# Patient Record
Sex: MALE | Race: OTHER | Employment: FULL TIME | URBAN - METROPOLITAN AREA
[De-identification: names, ages, dates, MRNs, and addresses within clinical notes are randomized per-mention and may not be internally consistent; named-entity substitution may affect disease eponyms.]

---

## 2024-04-23 ENCOUNTER — APPOINTMENT (EMERGENCY)
Dept: RADIOLOGY | Facility: HOSPITAL | Age: 54
End: 2024-04-23
Payer: COMMERCIAL

## 2024-04-23 ENCOUNTER — HOSPITAL ENCOUNTER (EMERGENCY)
Facility: HOSPITAL | Age: 54
Discharge: HOME | End: 2024-04-23
Attending: EMERGENCY MEDICINE
Payer: COMMERCIAL

## 2024-04-23 VITALS
TEMPERATURE: 98 F | SYSTOLIC BLOOD PRESSURE: 145 MMHG | RESPIRATION RATE: 18 BRPM | HEART RATE: 79 BPM | OXYGEN SATURATION: 97 % | DIASTOLIC BLOOD PRESSURE: 87 MMHG

## 2024-04-23 DIAGNOSIS — R50.9 FEVER: ICD-10-CM

## 2024-04-23 DIAGNOSIS — L02.91 ABSCESS: Primary | ICD-10-CM

## 2024-04-23 LAB
ALBUMIN SERPL BCP-MCNC: 3.6 G/DL (ref 3.5–5)
ALP SERPL-CCNC: 71 U/L (ref 34–104)
ALT SERPL W P-5'-P-CCNC: 17 U/L (ref 7–52)
ANION GAP SERPL CALCULATED.3IONS-SCNC: 6 MMOL/L (ref 4–13)
APTT PPP: 29 SECONDS (ref 23–37)
AST SERPL W P-5'-P-CCNC: 14 U/L (ref 13–39)
ATRIAL RATE: 89 BPM
BACTERIA UR QL AUTO: NORMAL /HPF
BASOPHILS # BLD AUTO: 0.03 THOUSANDS/ÂΜL (ref 0–0.1)
BASOPHILS NFR BLD AUTO: 0 % (ref 0–1)
BILIRUB SERPL-MCNC: 0.52 MG/DL (ref 0.2–1)
BILIRUB UR QL STRIP: NEGATIVE
BUN SERPL-MCNC: 13 MG/DL (ref 5–25)
CALCIUM SERPL-MCNC: 8.8 MG/DL (ref 8.4–10.2)
CHLORIDE SERPL-SCNC: 102 MMOL/L (ref 96–108)
CLARITY UR: CLEAR
CO2 SERPL-SCNC: 29 MMOL/L (ref 21–32)
COLOR UR: YELLOW
CREAT SERPL-MCNC: 1 MG/DL (ref 0.6–1.3)
EOSINOPHIL # BLD AUTO: 0.07 THOUSAND/ÂΜL (ref 0–0.61)
EOSINOPHIL NFR BLD AUTO: 1 % (ref 0–6)
ERYTHROCYTE [DISTWIDTH] IN BLOOD BY AUTOMATED COUNT: 13.2 % (ref 11.6–15.1)
FLUAV RNA RESP QL NAA+PROBE: NEGATIVE
FLUBV RNA RESP QL NAA+PROBE: NEGATIVE
GFR SERPL CREATININE-BSD FRML MDRD: 84 ML/MIN/1.73SQ M
GLUCOSE SERPL-MCNC: 208 MG/DL (ref 65–140)
GLUCOSE UR STRIP-MCNC: ABNORMAL MG/DL
HCT VFR BLD AUTO: 39 % (ref 36.5–49.3)
HGB BLD-MCNC: 13 G/DL (ref 12–17)
HGB UR QL STRIP.AUTO: NEGATIVE
IMM GRANULOCYTES # BLD AUTO: 0.04 THOUSAND/UL (ref 0–0.2)
IMM GRANULOCYTES NFR BLD AUTO: 0 % (ref 0–2)
INR PPP: 1.17 (ref 0.84–1.19)
KETONES UR STRIP-MCNC: ABNORMAL MG/DL
LEUKOCYTE ESTERASE UR QL STRIP: NEGATIVE
LYMPHOCYTES # BLD AUTO: 1.37 THOUSANDS/ÂΜL (ref 0.6–4.47)
LYMPHOCYTES NFR BLD AUTO: 10 % (ref 14–44)
MCH RBC QN AUTO: 28.1 PG (ref 26.8–34.3)
MCHC RBC AUTO-ENTMCNC: 33.3 G/DL (ref 31.4–37.4)
MCV RBC AUTO: 84 FL (ref 82–98)
MONOCYTES # BLD AUTO: 1.03 THOUSAND/ÂΜL (ref 0.17–1.22)
MONOCYTES NFR BLD AUTO: 8 % (ref 4–12)
NEUTROPHILS # BLD AUTO: 10.93 THOUSANDS/ÂΜL (ref 1.85–7.62)
NEUTS SEG NFR BLD AUTO: 81 % (ref 43–75)
NITRITE UR QL STRIP: NEGATIVE
NON-SQ EPI CELLS URNS QL MICRO: NORMAL /HPF
NRBC BLD AUTO-RTO: 0 /100 WBCS
P AXIS: 64 DEGREES
PH UR STRIP.AUTO: 5.5 [PH] (ref 4.5–8)
PLATELET # BLD AUTO: 170 THOUSANDS/UL (ref 149–390)
PMV BLD AUTO: 10 FL (ref 8.9–12.7)
POTASSIUM SERPL-SCNC: 4.4 MMOL/L (ref 3.5–5.3)
PR INTERVAL: 144 MS
PROT SERPL-MCNC: 6.9 G/DL (ref 6.4–8.4)
PROT UR STRIP-MCNC: ABNORMAL MG/DL
PROTHROMBIN TIME: 14.8 SECONDS (ref 11.6–14.5)
QRS AXIS: 164 DEGREES
QRSD INTERVAL: 104 MS
QT INTERVAL: 378 MS
QTC INTERVAL: 459 MS
RBC # BLD AUTO: 4.63 MILLION/UL (ref 3.88–5.62)
RBC #/AREA URNS AUTO: NORMAL /HPF
RSV RNA RESP QL NAA+PROBE: NEGATIVE
SARS-COV-2 RNA RESP QL NAA+PROBE: NEGATIVE
SODIUM SERPL-SCNC: 137 MMOL/L (ref 135–147)
SP GR UR STRIP.AUTO: 1.02 (ref 1–1.03)
T WAVE AXIS: 80 DEGREES
UROBILINOGEN UR QL STRIP.AUTO: 0.2 E.U./DL
VENTRICULAR RATE: 89 BPM
WBC # BLD AUTO: 13.47 THOUSAND/UL (ref 4.31–10.16)
WBC #/AREA URNS AUTO: NORMAL /HPF

## 2024-04-23 PROCEDURE — 99285 EMERGENCY DEPT VISIT HI MDM: CPT | Performed by: EMERGENCY MEDICINE

## 2024-04-23 PROCEDURE — 96374 THER/PROPH/DIAG INJ IV PUSH: CPT

## 2024-04-23 PROCEDURE — 99284 EMERGENCY DEPT VISIT MOD MDM: CPT

## 2024-04-23 PROCEDURE — 87147 CULTURE TYPE IMMUNOLOGIC: CPT

## 2024-04-23 PROCEDURE — 81001 URINALYSIS AUTO W/SCOPE: CPT

## 2024-04-23 PROCEDURE — 36415 COLL VENOUS BLD VENIPUNCTURE: CPT

## 2024-04-23 PROCEDURE — 75635 CT ANGIO ABDOMINAL ARTERIES: CPT

## 2024-04-23 PROCEDURE — 85025 COMPLETE CBC W/AUTO DIFF WBC: CPT

## 2024-04-23 PROCEDURE — 96375 TX/PRO/DX INJ NEW DRUG ADDON: CPT

## 2024-04-23 PROCEDURE — 0241U HB NFCT DS VIR RESP RNA 4 TRGT: CPT

## 2024-04-23 PROCEDURE — 85610 PROTHROMBIN TIME: CPT

## 2024-04-23 PROCEDURE — 96361 HYDRATE IV INFUSION ADD-ON: CPT

## 2024-04-23 PROCEDURE — 87205 SMEAR GRAM STAIN: CPT

## 2024-04-23 PROCEDURE — 10060 I&D ABSCESS SIMPLE/SINGLE: CPT | Performed by: SURGERY

## 2024-04-23 PROCEDURE — 87075 CULTR BACTERIA EXCEPT BLOOD: CPT

## 2024-04-23 PROCEDURE — 87070 CULTURE OTHR SPECIMN AEROBIC: CPT

## 2024-04-23 PROCEDURE — 93005 ELECTROCARDIOGRAM TRACING: CPT

## 2024-04-23 PROCEDURE — 93010 ELECTROCARDIOGRAM REPORT: CPT | Performed by: INTERNAL MEDICINE

## 2024-04-23 PROCEDURE — 85730 THROMBOPLASTIN TIME PARTIAL: CPT

## 2024-04-23 PROCEDURE — 99243 OFF/OP CNSLTJ NEW/EST LOW 30: CPT | Performed by: SURGERY

## 2024-04-23 PROCEDURE — 80053 COMPREHEN METABOLIC PANEL: CPT

## 2024-04-23 RX ORDER — HYDROMORPHONE HCL/PF 1 MG/ML
0.5 SYRINGE (ML) INJECTION ONCE
Status: COMPLETED | OUTPATIENT
Start: 2024-04-23 | End: 2024-04-23

## 2024-04-23 RX ORDER — SULFAMETHOXAZOLE AND TRIMETHOPRIM 800; 160 MG/1; MG/1
1 TABLET ORAL ONCE
Status: COMPLETED | OUTPATIENT
Start: 2024-04-23 | End: 2024-04-23

## 2024-04-23 RX ORDER — KETOROLAC TROMETHAMINE 30 MG/ML
15 INJECTION, SOLUTION INTRAMUSCULAR; INTRAVENOUS ONCE
Status: COMPLETED | OUTPATIENT
Start: 2024-04-23 | End: 2024-04-23

## 2024-04-23 RX ORDER — SULFAMETHOXAZOLE AND TRIMETHOPRIM 800; 160 MG/1; MG/1
1 TABLET ORAL 2 TIMES DAILY
Qty: 14 TABLET | Refills: 0 | Status: SHIPPED | OUTPATIENT
Start: 2024-04-23 | End: 2024-04-30

## 2024-04-23 RX ORDER — LIDOCAINE HYDROCHLORIDE 10 MG/ML
10 INJECTION, SOLUTION EPIDURAL; INFILTRATION; INTRACAUDAL; PERINEURAL ONCE
Status: COMPLETED | OUTPATIENT
Start: 2024-04-23 | End: 2024-04-23

## 2024-04-23 RX ADMIN — LIDOCAINE HYDROCHLORIDE 10 ML: 10 INJECTION, SOLUTION EPIDURAL; INFILTRATION; INTRACAUDAL; PERINEURAL at 16:53

## 2024-04-23 RX ADMIN — HYDROMORPHONE HYDROCHLORIDE 0.5 MG: 1 INJECTION, SOLUTION INTRAMUSCULAR; INTRAVENOUS; SUBCUTANEOUS at 16:54

## 2024-04-23 RX ADMIN — SODIUM CHLORIDE 1000 ML: 0.9 INJECTION, SOLUTION INTRAVENOUS at 10:15

## 2024-04-23 RX ADMIN — IOHEXOL 120 ML: 350 INJECTION, SOLUTION INTRAVENOUS at 12:05

## 2024-04-23 RX ADMIN — SULFAMETHOXAZOLE AND TRIMETHOPRIM 1 TABLET: 800; 160 TABLET ORAL at 17:39

## 2024-04-23 RX ADMIN — KETOROLAC TROMETHAMINE 15 MG: 30 INJECTION, SOLUTION INTRAMUSCULAR; INTRAVENOUS at 10:20

## 2024-04-23 NOTE — PROCEDURES
"Incision and drain    Date/Time: 4/24/2024 6:00 PM    Performed by: Julián Apple MD  Authorized by: Julián Apple MD  Universal Protocol:  Consent: Verbal consent obtained.  Risks and benefits: risks, benefits and alternatives were discussed  Consent given by: patient  Time out: Immediately prior to procedure a \"time out\" was called to verify the correct patient, procedure, equipment, support staff and site/side marked as required.  Timeout called at: 4/23/2024 6:00 PM.  Patient identity confirmed: verbally with patient, provided demographic data and arm band    Patient location:  ED  Location:     Type:  Fluid collection    Location:  Lower extremity    Lower extremity location:  L leg  Pre-procedure details:     Skin preparation:  Betadine  Sedation:     Sedation type:  Anxiolysis  Anesthesia (see MAR for exact dosages):     Anesthesia method:  Local infiltration    Local anesthetic:  Lidocaine 1% w/o epi  Procedure details:     Complexity:  Simple    Needle aspiration: yes      Needle size:  22 G    Incision types:  Cruciate    Scalpel blade:  15    Approach:  Open and puncture    Incision depth:  Subcutaneous    Wound management:  Probed and deloculated and irrigated with saline    Drainage:  Bloody    Drainage amount:  Moderate    Wound treatment:  Wound left open and packing placed    Packing materials:  1/4 in iodoform gauze  Post-procedure details:     Patient tolerance of procedure:  Tolerated well, no immediate complications        Julián Apple MD  General Surgery   04/24/24      "

## 2024-04-23 NOTE — ED ATTENDING ATTESTATION
4/23/2024  I, Cherise Hartman DO, saw and evaluated the patient. I have discussed the patient with the resident/non-physician practitioner and agree with the resident's/non-physician practitioner's findings, Plan of Care, and MDM as documented in the resident's/non-physician practitioner's note, except where noted. All available labs and Radiology studies were reviewed.  I was present for key portions of any procedure(s) performed by the resident/non-physician practitioner and I was immediately available to provide assistance.       At this point I agree with the current assessment done in the Emergency Department.  I have conducted an independent evaluation of this patient a history and physical is as follows:    53-year-old male presents with pain in his left groin.  Patient states he has swelling in that area that has worsened and is now red and tender.  Patient also complaining of fever.  On exam-no acute distress, heart regular, no respiratory distress, left proximal thigh on the medial aspect patient has erythematous area with swelling that is mildly tender to palpation and is warm to touch.  Plan-unclear etiology of this area of swelling but will do labs, bedside ultrasound inconclusive so we will do CT to further evaluate that area of swelling in the thigh    ED Course         Critical Care Time  Procedures

## 2024-04-23 NOTE — ED PROVIDER NOTES
History  Chief Complaint   Patient presents with    Fever     C/o of fever, sweating and pain in left leg. States his pain is around the groin where he feels a ball, that appeared after open heart surgery. States he is unsure about the ball every since because his chest was cracked for his heart attack. Denies CP or SOB. Took tylenol at 8am     53F w/ h/o DM, prior MI s/p CABG vs PCI about 2 years ago, presents to the ED due to fevers at night with associated sweats and increasing pain in his left proximal thigh. He had a small lump there after the procedure but was told it was normal. He has always noticed it but never had any issues until about 1 week ago. [place] and started noticing increasing pain and redness over the area. He then started developing the nighttime fevers/chills. The symptoms have progressively worsened so he came to the Ed for evaluation. He denies ha, n/v, cp, sob, or other subjective complaitns.     Prior to Admission Medications   Prescriptions Last Dose Informant Patient Reported? Taking?   metFORMIN (GLUCOPHAGE) 1000 MG tablet   Yes No   Sig: Take 1,000 mg by mouth 2 (two) times a day      Facility-Administered Medications: None       Past Medical History:   Diagnosis Date    Diabetes mellitus (HCC)     Heart attack (HCC) 2023       Past Surgical History:   Procedure Laterality Date    CARDIAC PACEMAKER PLACEMENT  2023    CARDIAC SURGERY  11/2023       History reviewed. No pertinent family history.  I have reviewed and agree with the history as documented.    E-Cigarette/Vaping     E-Cigarette/Vaping Substances     Social History     Tobacco Use    Smoking status: Never    Smokeless tobacco: Never   Substance Use Topics    Alcohol use: Yes     Comment: casual    Drug use: Never        Review of Systems   All other systems reviewed and are negative.      Physical Exam  ED Triage Vitals   Temperature Pulse Respirations Blood Pressure SpO2   04/23/24 0928  04/23/24 0928 04/23/24 0928 04/23/24 0928 04/23/24 0928   98 °F (36.7 °C) 90 18 150/98 98 %      Temp Source Heart Rate Source Patient Position - Orthostatic VS BP Location FiO2 (%)   04/23/24 0928 04/23/24 0928 04/23/24 1433 04/23/24 1433 --   Oral Monitor Lying Right arm       Pain Score       04/23/24 0928       8             Orthostatic Vital Signs  Vitals:    04/23/24 0928 04/23/24 1433   BP: 150/98 145/87   Pulse: 90 79   Patient Position - Orthostatic VS:  Lying       Physical Exam  Vitals and nursing note reviewed.   Constitutional:       General: He is not in acute distress.     Appearance: He is well-developed.   HENT:      Head: Normocephalic and atraumatic.   Eyes:      Conjunctiva/sclera: Conjunctivae normal.   Cardiovascular:      Rate and Rhythm: Normal rate and regular rhythm.      Heart sounds: No murmur heard.  Pulmonary:      Effort: Pulmonary effort is normal. No respiratory distress.      Breath sounds: Normal breath sounds.   Abdominal:      Palpations: Abdomen is soft.      Tenderness: There is no abdominal tenderness.   Musculoskeletal:      Cervical back: Neck supple.      Comments: Swelling, tenderness, erythema overlying left medial proximal thigh.  No palpable pulsation.   Skin:     General: Skin is warm and dry.      Capillary Refill: Capillary refill takes less than 2 seconds.   Neurological:      Mental Status: He is alert.   Psychiatric:         Mood and Affect: Mood normal.         ED Medications  Medications   sodium chloride 0.9 % bolus 1,000 mL (0 mL Intravenous Stopped 4/23/24 1654)   ketorolac (TORADOL) injection 15 mg (15 mg Intravenous Given 4/23/24 1020)   iohexol (OMNIPAQUE) 350 MG/ML injection (MULTI-DOSE) 120 mL (120 mL Intravenous Given 4/23/24 1205)   lidocaine (PF) (XYLOCAINE-MPF) 1 % injection 10 mL (10 mL Infiltration Given by Other 4/23/24 1653)   HYDROmorphone (DILAUDID) injection 0.5 mg (0.5 mg Intravenous Given 4/23/24 1654)   sulfamethoxazole-trimethoprim  (BACTRIM DS) 800-160 mg per tablet 1 tablet (1 tablet Oral Given 4/23/24 1739)       Diagnostic Studies  Results Reviewed       Procedure Component Value Units Date/Time    Wound culture and Gram stain [[id number]]  (Abnormal) Collected: 04/23/24 1739    Lab Status: Final result Specimen: Wound from Leg, Left Updated: 04/26/24 1123     Wound Culture Growth in Broth culture only Staphylococcus coagulase negative     Gram Stain Result No Polys or Bacteria seen    Anaerobic culture and Gram stain [[id number]] Collected: 04/23/24 1739    Lab Status: Final result Specimen: Wound Updated: 04/26/24 0803     Anaerobic Culture No growth    Urine Microscopic [[id number]]  (Normal) Collected: 04/23/24 1101    Lab Status: Final result Specimen: Urine, Other Updated: 04/23/24 1121     RBC, UA None Seen /hpf      WBC, UA 1-2 /hpf      Epithelial Cells Occasional /hpf      Bacteria, UA None Seen /hpf     FLU/RSV/COVID - if FLU/RSV clinically relevant [[id number]]  (Normal) Collected: 04/23/24 1014    Lab Status: Final result Specimen: Nares from Nose Updated: 04/23/24 1107     SARS-CoV-2 Negative     INFLUENZA A PCR Negative     INFLUENZA B PCR Negative     RSV PCR Negative    Narrative:      FOR PEDIATRIC PATIENTS - copy/paste COVID Guidelines URL to browser: [email or web address]    SARS-CoV-2 assay is a Nucleic Acid Amplification assay intended for the  qualitative detection of nucleic acid from SARS-CoV-2 in nasopharyngeal  swabs. Results are for the presumptive identification of SARS-CoV-2 RNA.    Positive results are indicative of infection with SARS-CoV-2, the virus  causing COVID-19, but do not rule out bacterial infection or co-infection  with other viruses. Laboratories within the [place] and its  territories are required to report all positive results to the appropriate  public health authorities. Negative results do not preclude SARS-CoV-2  infection and should not  be used as the sole basis for treatment or other  patient management decisions. Negative results must be combined with  clinical observations, patient history, and epidemiological information.  This test has not been FDA cleared or approved.    This test has been authorized by FDA under an Emergency Use Authorization  (EUA). This test is only authorized for the duration of time the  declaration that circumstances exist justifying the authorization of the  emergency use of an in vitro diagnostic tests for detection of SARS-CoV-2  virus and/or diagnosis of COVID-19 infection under section 564(b)(1) of  the Act, 21 U.S.C. 360bbb-3(b)(1), unless the authorization is terminated  or revoked sooner. The test has been validated but independent review by FDA  and CLIA is pending.    Test performed using [employer] Gene[employer]pert: This RT-PCR assay targets N2,  a region unique to SARS-CoV-2. A conserved region in the E-gene was chosen  for pan-Sarbecovirus detection which includes SARS-CoV-2.    According to CMS-2020-01-R, this platform meets the definition of high-throughput technology.    Urine Macroscopic, POC [[id number]]  (Abnormal) Collected: 04/23/24 1101    Lab Status: Final result Specimen: Urine Updated: 04/23/24 1102     Color, UA Yellow     Clarity, UA Clear     pH, UA 5.5     Leukocytes, UA Negative     Nitrite, UA Negative     Protein, UA 30 (1+) mg/dl      Glucose,  (1/2%) mg/dl      Ketones, UA 15 (1+) mg/dl      Urobilinogen, UA 0.2 E.U./dl      Bilirubin, UA Negative     Occult Blood, UA Negative     Specific Gravity, UA 1.020    Narrative:      CLINITEK RESULT    Comprehensive metabolic panel [[id number]]  (Abnormal) Collected: 04/23/24 1014    Lab Status: Final result Specimen: Blood from Arm, Left Updated: 04/23/24 1055     Sodium 137 mmol/L      Potassium 4.4 mmol/L      Chloride 102 mmol/L      CO2 29 mmol/L      ANION GAP 6 mmol/L      BUN 13 mg/dL      Creatinine 1.00 mg/dL      Glucose 208 mg/dL       Calcium 8.8 mg/dL      AST 14 U/L      ALT 17 U/L      Alkaline Phosphatase 71 U/L      Total Protein 6.9 g/dL      Albumin 3.6 g/dL      Total Bilirubin 0.52 mg/dL      eGFR 84 ml/min/1.73sq m     Narrative:      National Kidney Disease Foundation guidelines for Chronic Kidney Disease (CKD):     Stage 1 with normal or high GFR (GFR > 90 mL/min/1.73 square meters)    Stage 2 Mild CKD (GFR = 60-89 mL/min/1.73 square meters)    Stage 3A Moderate CKD (GFR = 45-59 mL/min/1.73 square meters)    Stage 3B Moderate CKD (GFR = 30-44 mL/min/1.73 square meters)    Stage 4 Severe CKD (GFR = 15-29 mL/min/1.73 square meters)    Stage 5 End Stage CKD (GFR <15 mL/min/1.73 square meters)  Note: GFR calculation is accurate only with a steady state creatinine    Protime-INR [[id number]]  (Abnormal) Collected: 04/23/24 1014    Lab Status: Final result Specimen: Blood from Arm, Left Updated: 04/23/24 1050     Protime 14.8 seconds      INR 1.17    APTT [[id number]]  (Normal) Collected: 04/23/24 1014    Lab Status: Final result Specimen: Blood from Arm, Left Updated: 04/23/24 1050     PTT 29 seconds     CBC and differential [[id number]]  (Abnormal) Collected: 04/23/24 1014    Lab Status: Final result Specimen: Blood from Arm, Left Updated: 04/23/24 1025     WBC 13.47 Thousand/uL      RBC 4.63 Million/uL      Hemoglobin 13.0 g/dL      Hematocrit 39.0 %      MCV 84 fL      MCH 28.1 pg      MCHC 33.3 g/dL      RDW 13.2 %      MPV 10.0 fL      Platelets 170 Thousands/uL      nRBC 0 /100 WBCs      Segmented % 81 %      Immature Grans % 0 %      Lymphocytes % 10 %      Monocytes % 8 %      Eosinophils Relative 1 %      Basophils Relative 0 %      Absolute Neutrophils 10.93 Thousands/µL      Absolute Immature Grans 0.04 Thousand/uL      Absolute Lymphocytes 1.37 Thousands/µL      Absolute Monocytes 1.03 Thousand/µL      Eosinophils Absolute 0.07 Thousand/µL      Basophils Absolute 0.03 Thousands/µL                    CTA abdominal w run off w wo  contrast   Final Result by Bubba Azul MD (04/23 6249)      Vascular:   No acute findings. Bilateral tibial vascular disease. Moderate narrowing of the left common femoral artery.      Nonvascular:   1.  Well-circumscribed 4 cm fluid collection in the superficial tissues of the medial left thigh, likely an abscess given the clinical history.   2.  An area of poorly defined rounded hyperattenuation abuts the superior margin of the prostate, nonspecific. Correlate with patient symptoms and/or consider consultation with urology as clinically indicated.      The study was marked in EPIC for immediate notification.      Workstation performed: [id number]               Procedures  Procedures      ED Course  ED Course as of 04/28/24 2218   Tue Apr 23, 2024   1027 WBC(!): 13.47   1402 Called to request read                                       Medical Decision Making  53-year-old male present emergency department due to nighttime fevers with left leg findings concerning for possible abscess versus thrombophlebitis versus alternative inflammatory/infectious etiology.  Will obtain labs, CT to further evaluate.  There is a long delay to CT read as it required IR specifically per the radiology tech.  CT showing likely abscess collection in the space.  Discussed case with surgery who came to the bedside and drained it, packed it.  Antibiotics given in the ED and patient discharged with prescription, follow-up information.  Otherwise had no acute inpatient needs.  Provided return precautions.    Amount and/or Complexity of Data Reviewed  Labs: ordered. Decision-making details documented in ED Course.  Radiology: ordered.    Risk  Prescription drug management.          Disposition  Final diagnoses:   Abscess   Fever     Time reflects when diagnosis was documented in both MDM as applicable and the Disposition within this note       Time User Action Codes Description Comment    4/23/2024  4:20 PM Tr De Paz Add [L02.91]  Abscess     4/23/2024  5:31 PM Tr De Paz Add [R50.9] Fever           ED Disposition       ED Disposition   Discharge    Condition   Stable    Date/Time   Tue Apr 23, 2024  5:37 PM    Comment   Deb Quintero discharge to home/self care.                   Follow-up Information    None         Discharge Medication List as of 4/23/2024  5:46 PM        START taking these medications    Details   sulfamethoxazole-trimethoprim (BACTRIM DS) 800-160 mg per tablet Take 1 tablet by mouth 2 (two) times a day for 7 days smx-tmp DS (BACTRIM) 800-160 mg tabs (1tab q12 D10), Starting Tue 4/23/2024, Until Tue 4/30/2024, Print           CONTINUE these medications which have NOT CHANGED    Details   metFORMIN (GLUCOPHAGE) 1000 MG tablet Take 1,000 mg by mouth 2 (two) times a day, Historical Med           No discharge procedures on file.    PDMP Review       None             ED Provider  Attending physically available and evaluated Deb Quintero. I managed the patient along with the ED Attending.    Electronically Signed by           Tr De Paz MD  04/28/24 5433

## 2024-04-23 NOTE — DISCHARGE INSTRUCTIONS
Please take the bactrim (TMP-SMX) antibiotic 2 times per day for 7 days to further treat your infection.    You can take this prescription to any pharmacy in the area.

## 2024-04-23 NOTE — Clinical Note
Deb Quintero was seen and treated in our emergency department on 4/23/2024.                Diagnosis:     Wilf[place]  .    He may return on this date: 04/24/2024         If you have any questions or concerns, please don't hesitate to call.      Tr De Paz MD    ______________________________           _______________          _______________  Hospital Representative                              Date                                Time

## 2024-04-23 NOTE — CONSULTS
Consultation Note- General Surgery  : [facility] Resident role on [employer]  Deb Quintero 53 y.o. male MRN: [id number]  Unit/Bed#: ED 24 Encounter: [id number]        Assessment:  53 y.o. male with left medial thigh abscess. Fevers at home. WBC 13.    Plan:  - CT imaging reviewed. Soft tissue collection palpable on exam. Given patients symptoms and imaging, likely represents and abscess. Informed consent obtained and patient underwent Incision and drainage at bedside.  - Cultures sent, will follow up results  - Daily packing changes to thigh wound.  - Bactrim DS for 7 days  - Return precautions provided.    HPI:  Deb Quintero is a 53 y.o. male with a history of DM2, MI s/p CABG. Presents with worsening left thigh pain, fevers and chills intermittently at home. Patient on presentation is afebrile and hemodynamically stable. WBC 13. Well-circumscribed 4 cm fluid collection in the superficial tissues of the medial left thigh. Palpable on exam, tender. Minimal erythema. Incised and drained at bedside.    Physical Exam:  General: No acute distress, alert and oriented  CV: Well perfused, regular rate and rhythm  Lungs: Normal work of breathing, no increased respiratory effort  Abdomen: Soft, non-tender, non-distended.  Extremities: Left thigh soft tissue fluid collection, tender, mild erythema, no induration  Skin: Warm, dry        Review of Systems   Review of systems negative except as per HPI.    Objective         Intake/Output Summary (Last 24 hours) at 4/23/2024 1857  Last data filed at 4/23/2024 1654  Gross per 24 hour   Intake 1000 ml   Output --   Net 1000 ml       First Vitals:   Blood Pressure: 150/98 (04/23/24 0928)  Pulse: 90 (04/23/24 0928)  Temperature: 98 °F (36.7 °C) (04/23/24 0928)  Temp Source: Oral (04/23/24 0928)  Respirations: 18 (04/23/24 0928)  SpO2: 98 % (04/23/24 0928)    Current Vitals:   Blood Pressure: 145/87 (04/23/24 1433)  Pulse: 79 (04/23/24 1433)  Temperature: 98 °F  (36.7 °C) (04/23/24 0928)  Temp Source: Oral (04/23/24 0928)  Respirations: 18 (04/23/24 0928)  SpO2: 97 % (04/23/24 1433)    Invasive Devices       None                   Imaging: I have personally reviewed pertinent reports.      CTA abdominal w run off w wo contrast    Result Date: 4/23/2024  Impression: Vascular: No acute findings. Bilateral tibial vascular disease. Moderate narrowing of the left common femoral artery. Nonvascular: 1.  Well-circumscribed 4 cm fluid collection in the superficial tissues of the medial left thigh, likely an abscess given the clinical history. 2.  An area of poorly defined rounded hyperattenuation abuts the superior margin of the prostate, nonspecific. Correlate with patient symptoms and/or consider consultation with urology as clinically indicated. The study was marked in EPIC for immediate notification. Workstation performed: [id number]       EKG, Pathology, and Other Studies: I have personally reviewed pertinent reports.        Historical Information   Past Medical History:   Diagnosis Date    Diabetes mellitus (HCC)     Heart attack (HCC) 2023     Past Surgical History:   Procedure Laterality Date    CARDIAC PACEMAKER PLACEMENT  2023    CARDIAC SURGERY  11/2023     Social History   Social History     Substance and Sexual Activity   Alcohol Use Yes    Comment: casual     Social History     Substance and Sexual Activity   Drug Use Never     Social History     Tobacco Use   Smoking Status Never   Smokeless Tobacco Never     History reviewed. No pertinent family history.    Meds/Allergies   all current active meds have been reviewed, current meds:   No current facility-administered medications for this encounter.    and PTA meds:   Prior to Admission Medications   Prescriptions Last Dose Informant Patient Reported? Taking?   metFORMIN (GLUCOPHAGE) 1000 MG tablet   Yes No   Sig: Take 1,000 mg by mouth 2 (two) times a day      Facility-Administered Medications: None     No Known  Allergies    Lab Results: I have personally reviewed pertinent lab results.  , CBC:   Lab Results   Component Value Date    WBC 13.47 (H) 04/23/2024    HGB 13.0 04/23/2024    HCT 39.0 04/23/2024    MCV 84 04/23/2024     04/23/2024    RBC 4.63 04/23/2024    MCH 28.1 04/23/2024    MCHC 33.3 04/23/2024    RDW 13.2 04/23/2024    MPV 10.0 04/23/2024    NRBC 0 04/23/2024   , CMP:   Lab Results   Component Value Date    SODIUM 137 04/23/2024    K 4.4 04/23/2024     04/23/2024    CO2 29 04/23/2024    BUN 13 04/23/2024    CREATININE 1.00 04/23/2024    CALCIUM 8.8 04/23/2024    AST 14 04/23/2024    ALT 17 04/23/2024    ALKPHOS 71 04/23/2024    EGFR 84 04/23/2024       Counseling / Coordination of Care  Total floor / unit time spent today 25 minutes.  Greater than 50% of total time was spent with the patient and / or family counseling and / or coordination of care.    Consults    Julián Apple MD  General Surgery   04/23/24

## 2024-04-24 PROCEDURE — NC001 PR NO CHARGE: Performed by: SURGERY

## 2024-04-26 LAB
BACTERIA SPEC ANAEROBE CULT: NO GROWTH
BACTERIA WND AEROBE CULT: ABNORMAL
GRAM STN SPEC: ABNORMAL

## 2024-10-04 ENCOUNTER — HOSPITAL ENCOUNTER (EMERGENCY)
Facility: HOSPITAL | Age: 54
Discharge: HOME | End: 2024-10-04
Attending: EMERGENCY MEDICINE
Payer: COMMERCIAL

## 2024-10-04 VITALS
RESPIRATION RATE: 16 BRPM | OXYGEN SATURATION: 96 % | HEART RATE: 99 BPM | SYSTOLIC BLOOD PRESSURE: 178 MMHG | TEMPERATURE: 98.5 F | DIASTOLIC BLOOD PRESSURE: 102 MMHG

## 2024-10-04 DIAGNOSIS — J06.9 URI (UPPER RESPIRATORY INFECTION): Primary | ICD-10-CM

## 2024-10-04 DIAGNOSIS — Z95.1 HX OF CABG: ICD-10-CM

## 2024-10-04 DIAGNOSIS — I10 HTN (HYPERTENSION): ICD-10-CM

## 2024-10-04 DIAGNOSIS — E78.5 HLD (HYPERLIPIDEMIA): ICD-10-CM

## 2024-10-04 DIAGNOSIS — E11.9 DIABETES (HCC): ICD-10-CM

## 2024-10-04 PROCEDURE — 99282 EMERGENCY DEPT VISIT SF MDM: CPT

## 2024-10-04 PROCEDURE — 99284 EMERGENCY DEPT VISIT MOD MDM: CPT | Performed by: EMERGENCY MEDICINE

## 2024-10-04 NOTE — DISCHARGE INSTRUCTIONS
You are seen emergency department for not feeling.  After evaluation we feel that you are having respiratory tract infection or a cold.  You may use over-the-counter remedies such as Tylenol, ibuprofen, decongestants, cough drops, etc. please return the emergency department should you experience any severe fevers, chest pain, shortness of breath, nausea/vomiting that you cannot control, diarrhea, constipation, or any other concerning symptom that you would like evaluated.  Please follow-up with your cardiologist/primary care doctor for evaluation of your blood pressure.

## 2024-10-04 NOTE — ED PROVIDER NOTES
"Final diagnoses:   URI (upper respiratory infection)   Diabetes (HCC)   HTN (hypertension)   HLD (hyperlipidemia)   Hx of CABG     ED Disposition       ED Disposition   Discharge    Condition   Stable    Date/Time   Fri Oct 4, 2024  1:58 PM    Comment   Deb Quintero discharge to home/self care.                   Assessment & Plan       Medical Decision Making  Patient is a 54 y.o. male with PMH of diabetes, hypertension, hyperlipidemia, CAD status post CABG who presents to the ED with fevers, cough.    Vital signs hypertensive. Physical exam as above.    History and physical exam most consistent with URI. However, differential diagnosis included but not limited to pneumonia, sinusitis, COPD.     Plan: Patient's symptoms and physical exam most consistent with URI.  No further testing indicated.    View ED course above for further discussion on patient workup.     On review of previous records, previous ED visit from April reviewed.    All labs reviewed and utilized in the medical decision making process  All radiology studies independently viewed by me and interpreted by the radiologist.  I reviewed all testing with the patient.     Upon re-evaluation patient continued stable.  Patient's blood pressure had dropped to 178/102 on recheck.    Disposition: Patient discharged in stable condition.  Patient given strict return precautions.  Patient will use over-the-counter cold remedies such as Tylenol, ibuprofen, nasal decongestants, etc.  Patient will follow-up with primary care/cardiology for evaluation of elevated blood pressure.    Portions of the record may have been created with voice recognition software. Occasional wrong word or \"sound a like\" substitutions may have occurred due to the inherent limitations of voice recognition software. Read the chart carefully and recognize, using context, where substitutions have occurred.              Medications - No data to display    ED Risk Strat Scores       "                                         History of Present Illness       Chief Complaint   Patient presents with    Fever     Fever since last night       Past Medical History:   Diagnosis Date    Diabetes mellitus (HCC)     Heart attack (HCC) 2023      Past Surgical History:   Procedure Laterality Date    CARDIAC PACEMAKER PLACEMENT  2023    CARDIAC SURGERY  11/2023      History reviewed. No pertinent family history.   Social History     Tobacco Use    Smoking status: Never    Smokeless tobacco: Never   Substance Use Topics    Alcohol use: Yes     Comment: casual    Drug use: Never      E-Cigarette/Vaping      E-Cigarette/Vaping Substances      I have reviewed and agree with the history as documented.     Patient is a 54-year-old male with past medical history of diabetes, hypertension, hyperlipidemia, CAD status post CABG who presents today with fever.  Patient notes that he felt that he had a fever last night.  He did not take his temperature but felt warm, and took 2 Tylenol at 10 PM.  Patient states that he has been having a cough and sore throat since yesterday as well.  Patient notes that he was concerned because last time he had a fever and was seen in the ER he had an abscess which was treated.  Patient notes that he has not had any other concerning symptoms.  Patient notes that he does have a history of high blood pressure and is on medications for it.  Patient denies any severe headache, vision changes, chest pain, shortness of breath, abdominal pain, nausea, vomiting, diarrhea, constipation, urinary frequency, dysuria, hematuria, lower extremity swelling, or any other concerning symptoms.        Review of Systems   Constitutional:  Positive for fever. Negative for chills.   HENT:  Negative for ear pain and sore throat.    Eyes:  Negative for pain and visual disturbance.   Respiratory:  Positive for cough. Negative for shortness of breath.    Cardiovascular:  Negative for chest pain and palpitations.    Gastrointestinal:  Negative for abdominal pain and vomiting.   Genitourinary:  Negative for dysuria and hematuria.   Musculoskeletal:  Positive for myalgias. Negative for arthralgias and back pain.   Skin:  Negative for color change and rash.   Neurological:  Negative for seizures and syncope.   All other systems reviewed and are negative.          Objective       ED Triage Vitals   Temperature Pulse Blood Pressure Respirations SpO2 Patient Position - Orthostatic VS   10/04/24 1303 10/04/24 1305 10/04/24 1305 10/04/24 1305 10/04/24 1305 10/04/24 1358   98.5 °F (36.9 °C) 99 (!) 203/121 16 96 % Lying      Temp src Heart Rate Source BP Location FiO2 (%) Pain Score    -- -- 10/04/24 1358 -- 10/04/24 1305      Left arm  6      Vitals      Date and Time Temp Pulse SpO2 Resp BP Pain Score FACES Pain Rating User   10/04/24 1305 -- 99 96 % 16 203/121 6 -- AK   10/04/24 1303 98.5 °F (36.9 °C) -- -- -- -- -- -- AK            Physical Exam  Vitals and nursing note reviewed.   Constitutional:       General: He is not in acute distress.     Appearance: Normal appearance. He is well-developed.   HENT:      Head: Normocephalic and atraumatic.      Nose: Nose normal.      Mouth/Throat:      Mouth: Mucous membranes are moist.      Tongue: No lesions.      Palate: No mass.      Pharynx: Oropharynx is clear. Posterior oropharyngeal erythema and postnasal drip present. No pharyngeal swelling or oropharyngeal exudate.      Tonsils: No tonsillar exudate or tonsillar abscesses.   Eyes:      Extraocular Movements: Extraocular movements intact.      Conjunctiva/sclera: Conjunctivae normal.      Pupils: Pupils are equal, round, and reactive to light.   Cardiovascular:      Rate and Rhythm: Normal rate and regular rhythm.      Pulses: Normal pulses.      Heart sounds: Normal heart sounds. No murmur heard.     No friction rub. No gallop.   Pulmonary:      Effort: Pulmonary effort is normal. No respiratory distress.      Breath sounds: Normal  breath sounds.   Abdominal:      General: Abdomen is flat. Bowel sounds are normal. There is no distension.      Palpations: Abdomen is soft. There is no mass.      Tenderness: There is no abdominal tenderness. There is no guarding or rebound.   Musculoskeletal:         General: No swelling. Normal range of motion.      Cervical back: Normal range of motion and neck supple.   Skin:     General: Skin is warm and dry.      Capillary Refill: Capillary refill takes less than 2 seconds.   Neurological:      Mental Status: He is alert and oriented to person, place, and time.   Psychiatric:         Mood and Affect: Mood normal.         Results Reviewed       None            No orders to display       Procedures    ED Medication and Procedure Management   Prior to Admission Medications   Prescriptions Last Dose Informant Patient Reported? Taking?   metFORMIN (GLUCOPHAGE) 1000 MG tablet   Yes No   Sig: Take 1,000 mg by mouth 2 (two) times a day      Facility-Administered Medications: None     Patient's Medications   Discharge Prescriptions    No medications on file     No discharge procedures on file.  ED SEPSIS DOCUMENTATION   Time reflects when diagnosis was documented in both MDM as applicable and the Disposition within this note       Time User Action Codes Description Comment    10/4/2024  1:58 PM Nahid Ruelas [J06.9] URI (upper respiratory infection)     10/4/2024  1:58 PM Nahid Ruelas [E11.9] Diabetes (HCC)     10/4/2024  1:58 PM Nahid Ruelas [I10] HTN (hypertension)     10/4/2024  1:58 PM Nahid Ruelas [E78.5] HLD (hyperlipidemia)     10/4/2024  1:59 PM Nahid Ruelas [Z95.1] Hx of CABG                  Nahid Ruelas DO  10/04/24 1415

## 2024-10-06 NOTE — ED ATTENDING ATTESTATION
10/4/2024  I, Fredy Hammond DO, saw and evaluated the patient. I have discussed the patient with the resident/non-physician practitioner and agree with the resident's/non-physician practitioner's findings, Plan of Care, and MDM as documented in the resident's/non-physician practitioner's note, except where noted. All available labs and Radiology studies were reviewed.  I was present for key portions of any procedure(s) performed by the resident/non-physician practitioner and I was immediately available to provide assistance.       At this point I agree with the current assessment done in the Emergency Department.  I have conducted an independent evaluation of this patient a history and physical is as follows:    54-year-old male URI symptoms.  Ongoing for few days history of abscess in the leg months ago where he had a fever he has no leg complaints now will was concerned because he had a fever otherwise normal exam reassurance discharge    ED Course         Critical Care Time  Procedures

## 2024-10-07 LAB
LEFT EYE DIABETIC RETINOPATHY: NORMAL
RIGHT EYE DIABETIC RETINOPATHY: NORMAL

## 2024-11-25 ENCOUNTER — OFFICE VISIT (OUTPATIENT)
Age: 54
End: 2024-11-25
Payer: COMMERCIAL

## 2024-11-25 VITALS
DIASTOLIC BLOOD PRESSURE: 100 MMHG | TEMPERATURE: 98.2 F | SYSTOLIC BLOOD PRESSURE: 180 MMHG | OXYGEN SATURATION: 97 % | BODY MASS INDEX: 29.89 KG/M2 | WEIGHT: 186 LBS | HEART RATE: 96 BPM | HEIGHT: 66 IN

## 2024-11-25 DIAGNOSIS — G89.29 CHRONIC RIGHT SHOULDER PAIN: ICD-10-CM

## 2024-11-25 DIAGNOSIS — M25.511 CHRONIC RIGHT SHOULDER PAIN: ICD-10-CM

## 2024-11-25 DIAGNOSIS — R22.32 MASS OF SKIN OF LEFT THUMB: ICD-10-CM

## 2024-11-25 DIAGNOSIS — I50.9 HEART FAILURE, UNSPECIFIED HF CHRONICITY, UNSPECIFIED HEART FAILURE TYPE (HCC): ICD-10-CM

## 2024-11-25 DIAGNOSIS — E11.69 TYPE 2 DIABETES MELLITUS WITH OTHER SPECIFIED COMPLICATION, UNSPECIFIED WHETHER LONG TERM INSULIN USE (HCC): Chronic | ICD-10-CM

## 2024-11-25 DIAGNOSIS — R73.01 ELEVATED FASTING GLUCOSE: ICD-10-CM

## 2024-11-25 DIAGNOSIS — I21.9 MYOCARDIAL INFARCTION, UNSPECIFIED MI TYPE, UNSPECIFIED ARTERY (HCC): Chronic | ICD-10-CM

## 2024-11-25 DIAGNOSIS — Z12.5 SCREENING FOR PROSTATE CANCER: ICD-10-CM

## 2024-11-25 DIAGNOSIS — I10 PRIMARY HYPERTENSION: ICD-10-CM

## 2024-11-25 DIAGNOSIS — Z12.11 ENCOUNTER FOR COLORECTAL CANCER SCREENING: Primary | ICD-10-CM

## 2024-11-25 DIAGNOSIS — Z13.228 SCREENING FOR METABOLIC DISORDER: ICD-10-CM

## 2024-11-25 DIAGNOSIS — I25.110 CORONARY ARTERY DISEASE INVOLVING NATIVE CORONARY ARTERY OF NATIVE HEART WITH UNSTABLE ANGINA PECTORIS (HCC): Chronic | ICD-10-CM

## 2024-11-25 DIAGNOSIS — F33.41 RECURRENT MAJOR DEPRESSIVE DISORDER, IN PARTIAL REMISSION (HCC): ICD-10-CM

## 2024-11-25 DIAGNOSIS — Z12.12 ENCOUNTER FOR COLORECTAL CANCER SCREENING: Primary | ICD-10-CM

## 2024-11-25 DIAGNOSIS — E78.5 HYPERLIPIDEMIA, UNSPECIFIED HYPERLIPIDEMIA TYPE: ICD-10-CM

## 2024-11-25 PROCEDURE — 99215 OFFICE O/P EST HI 40 MIN: CPT | Performed by: NURSE PRACTITIONER

## 2024-11-25 PROCEDURE — 93000 ELECTROCARDIOGRAM COMPLETE: CPT | Performed by: NURSE PRACTITIONER

## 2024-11-25 RX ORDER — SACUBITRIL AND VALSARTAN 97; 103 MG/1; MG/1
1 TABLET, FILM COATED ORAL 2 TIMES DAILY
COMMUNITY

## 2024-11-25 RX ORDER — EZETIMIBE 10 MG/1
10 TABLET ORAL DAILY
COMMUNITY

## 2024-11-25 RX ORDER — SPIRONOLACTONE 25 MG/1
25 TABLET ORAL DAILY
COMMUNITY

## 2024-11-25 RX ORDER — ASPIRIN 81 MG/1
81 TABLET ORAL DAILY
COMMUNITY

## 2024-11-25 RX ORDER — ROSUVASTATIN CALCIUM 40 MG/1
40 TABLET, COATED ORAL DAILY
COMMUNITY

## 2024-11-25 RX ORDER — CARVEDILOL 25 MG/1
25 TABLET ORAL 2 TIMES DAILY WITH MEALS
COMMUNITY

## 2024-11-25 NOTE — ASSESSMENT & PLAN NOTE
-uncontrolled   -Discussed risks of uncontrolled hypertension  -Restart Coreg as well as Entresto  Continue to monitor blood pressure at home.  Goal BP is < 130/80.  Contact our office for consistent elevations.  Recommend low sodium diet.  Exercise 30 minutes 5 times a week as tolerated.  Recommend yearly eye exam.

## 2024-11-25 NOTE — ASSESSMENT & PLAN NOTE
-referral to Cardiology   -symptomatic, EKG reviewed with Dr. Harry while in the office today   -Advised to restart aspirin, Entresto, and Coreg    Orders:    Ambulatory Referral to Cardiology; Future    Echo complete w/ contrast if indicated; Future    POCT ECG

## 2024-11-25 NOTE — ASSESSMENT & PLAN NOTE
-No recent hemoglobin A1c  -Due for eye exam and foot exam  -Restart metformin  Patient is to continue to work on diet and exercise.  Limit sugars and carbohydrate intake.    Avoid soda, juice, sweets, cookies, desserts, pasta, bread.   Eat more whole grains, exercised 30 min of cardio at least 3 times a week.  Also recommended daily foot exams to check for sores, and recommended yearly eye exams.      Orders:    Albumin / creatinine urine ratio; Future

## 2024-11-25 NOTE — ASSESSMENT & PLAN NOTE
-Due for lipid panel  -Restart Zetia and Crestor  -Recommend healthy lifestyle choices for your cholesterol.  Low fat/low cholesterol diet.  Limit/avoid red meat.  Eat more lean meat - chicken breast, ground turkey, fish.  Exercise 30 mins at least 5 times a week as tolerated.        Orders:    Lipid panel

## 2024-11-25 NOTE — PROGRESS NOTES
Name: Deb Quintero      : 1970      MRN: [id number]  Encounter Provider: HENRI Castorena  Encounter Date: 2024   Encounter department: [facility] [place]  :  Assessment & Plan  Encounter for colorectal cancer screening    Orders:    Cologuard    Screening for metabolic disorder    Orders:    CBC and differential    Comprehensive metabolic panel; Future    Lipid panel    Hyperlipidemia, unspecified hyperlipidemia type  -Due for lipid panel  -Restart Zetia and Crestor  -Recommend healthy lifestyle choices for your cholesterol.  Low fat/low cholesterol diet.  Limit/avoid red meat.  Eat more lean meat - chicken breast, ground turkey, fish.  Exercise 30 mins at least 5 times a week as tolerated.        Orders:    Lipid panel    Elevated fasting glucose    Orders:    Hemoglobin A1C    Heart failure, unspecified HF chronicity, unspecified heart failure type (HCC)  Wt Readings from Last 3 Encounters:   24 84.4 kg (186 lb)     -referral to cardiology   -ECHO ASAP   -Restart spironolactone, and Entresto          Orders:    Ambulatory Referral to Cardiology; Future    Echo complete w/ contrast if indicated; Future    Myocardial infarction, unspecified MI type, unspecified artery (HCC)  -referral to Cardiology   -symptomatic, EKG reviewed with Dr. Harry while in the office today   -Advised to restart aspirin, Entresto, and Coreg    Orders:    Ambulatory Referral to Cardiology; Future    Echo complete w/ contrast if indicated; Future    POCT ECG    Coronary artery disease involving native coronary artery of native heart with unstable angina pectoris (HCC)    Orders:    Ambulatory Referral to Cardiology; Future    Echo complete w/ contrast if indicated; Future    POCT ECG    Type 2 diabetes mellitus with other specified complication, unspecified whether long term insulin use (HCC)  -No recent hemoglobin A1c  -Due for eye exam and foot exam  -Restart  metformin  Patient is to continue to work on diet and exercise.  Limit sugars and carbohydrate intake.    Avoid soda, juice, sweets, cookies, desserts, pasta, bread.   Eat more whole grains, exercised 30 min of cardio at least 3 times a week.  Also recommended daily foot exams to check for sores, and recommended yearly eye exams.      Orders:    Albumin / creatinine urine ratio; Future    Screening for prostate cancer    Orders:    PSA, Total Screen; Future    Chronic right shoulder pain    Orders:    XR shoulder 2+ vw right; Future    Mass of skin of left thumb    Orders:    XR hand 2 vw left; Future    Primary hypertension  -uncontrolled   -Discussed risks of uncontrolled hypertension  -Restart Coreg as well as Entresto  Continue to monitor blood pressure at home.  Goal BP is < 130/80.  Contact our office for consistent elevations.  Recommend low sodium diet.  Exercise 30 minutes 5 times a week as tolerated.  Recommend yearly eye exam.          Recurrent major depressive disorder, in partial remission (HCC)  Depression Screening Follow-up Plan: Patient's depression screening was positive with a PHQ-2 score of 3. Their PHQ-9 score was 13. Patient assessed for underlying major depression. They have no active suicidal ideations. Brief counseling provided and recommend additional follow-up/re-evaluation next office visit.               Depression Screening and Follow-up Plan: Patient's depression screening was positive with a PHQ-2 score of 3. Their PHQ-9 score was 13. Patient assessed for underlying major depression. Brief counseling provided and recommend additional follow-up/re-evaluation next office visit.       History of Present Illness     Patient presents today to establish care with our practice  Stopped medications about a month ago, due to constipation   Discussed risks of not taking his medications   Moved to the area about 2.5 years ago        Heart failure, unspecified (HCC)-has not had recent Echo, last  Echo 2022, does endorse shortness of breath, previously on Entresto, had ICD placement, previously taking spironolactone       MI (myocardial infarction)-2022, s/p CABG x3 2023, he reports chest pain when he goes up steps with SOB, has SOB with ambulation, previously taking aspirin Coreg Zetia, Crestor and Entresto    Primary hypertension- has not taken any medications, previously controlled on Entresto, Coreg and Aldactone    Type 2 diabetes mellitus with other specified complication (HCC)-was previously on metformin, currently not taking medication has not had updated blood work    Reports right shoulder pain, reports decreased ROM, denies injury, started in March     Has a mass to his left thumb, reports pain, denies injury, 4-5 months       Tobacco abuse-denies  Alcohol use-denies  Illegal drug use-denies     Colon cancer screening-denies  Prostate cancer screening-denies          Review of Systems   Constitutional:  Negative for activity change, appetite change, chills, diaphoresis and fever.   HENT:  Negative for congestion, ear discharge, ear pain, postnasal drip, rhinorrhea, sinus pressure, sinus pain and sore throat.    Eyes:  Negative for pain, discharge, itching and visual disturbance.   Respiratory:  Negative for cough, chest tightness, shortness of breath and wheezing.    Cardiovascular:  Negative for chest pain, palpitations and leg swelling.   Gastrointestinal:  Negative for abdominal pain, constipation, diarrhea, nausea and vomiting.   Endocrine: Negative for polydipsia, polyphagia and polyuria.   Genitourinary:  Negative for difficulty urinating, dysuria and urgency.   Musculoskeletal:  Positive for arthralgias. Negative for back pain and neck pain.   Skin:  Negative for rash and wound.   Neurological:  Negative for dizziness, weakness, numbness and headaches.     Past Medical History   Past Medical History:   Diagnosis Date    Diabetes mellitus (HCC)     Heart attack (HCC) 2023    Hyperlipidemia   "    Past Surgical History:   Procedure Laterality Date    CARDIAC PACEMAKER PLACEMENT  2023    CARDIAC SURGERY  11/2023     Family History   Problem Relation Age of Onset    Hyperlipidemia Mother     Coronary artery disease Father     Hyperlipidemia Father     No Known Problems Sister     Hypertension Brother     Hypertension Brother     Heart attack Paternal Grandfather       reports that he has never smoked. He has never used smokeless tobacco. He reports current alcohol use. He reports that he does not use drugs.  Current Outpatient Medications on File Prior to Visit   Medication Sig Dispense Refill    aspirin (ECOTRIN LOW STRENGTH) 81 mg EC tablet Take 81 mg by mouth daily      carvedilol (COREG) 25 mg tablet Take 25 mg by mouth 2 (two) times a day with meals      Empagliflozin (Jardiance) 25 MG TABS Take 25 mg by mouth every morning      ezetimibe (ZETIA) 10 mg tablet Take 10 mg by mouth daily      rosuvastatin (CRESTOR) 40 MG tablet Take 40 mg by mouth daily      sacubitril-valsartan (Entresto)  MG TABS Take 1 tablet by mouth 2 (two) times a day      spironolactone (ALDACTONE) 25 mg tablet Take 25 mg by mouth daily      metFORMIN (GLUCOPHAGE) 1000 MG tablet Take 1,000 mg by mouth 2 (two) times a day (Patient not taking: Reported on 11/25/2024)       No current facility-administered medications on file prior to visit.   No Known Allergies     Objective   BP (!) 180/100 (BP Location: Left arm, Patient Position: Sitting, Cuff Size: Standard)   Pulse 96   Temp 98.2 °F (36.8 °C) (Temporal)   Ht 5' 6.34\" (1.685 m)   Wt 84.4 kg (186 lb)   SpO2 97%   BMI 29.72 kg/m²      Physical Exam  Constitutional:       General: He is not in acute distress.     Appearance: He is well-developed. He is not diaphoretic.   HENT:      Head: Normocephalic and atraumatic.      Right Ear: External ear normal.      Left Ear: External ear normal.      Nose: Nose normal.      Mouth/Throat:      Mouth: Mucous membranes are moist. "      Pharynx: No oropharyngeal exudate or posterior oropharyngeal erythema.   Eyes:      General:         Right eye: No discharge.         Left eye: No discharge.      Conjunctiva/sclera: Conjunctivae normal.      Pupils: Pupils are equal, round, and reactive to light.   Neck:      Thyroid: No thyromegaly.   Cardiovascular:      Rate and Rhythm: Normal rate and regular rhythm.      Heart sounds: Normal heart sounds. No murmur heard.     No friction rub. No gallop.   Pulmonary:      Effort: Pulmonary effort is normal. No respiratory distress.      Breath sounds: Normal breath sounds. No stridor. No wheezing or rales.   Abdominal:      General: Bowel sounds are normal. There is no distension.      Palpations: Abdomen is soft.      Tenderness: There is no abdominal tenderness.   Musculoskeletal:        Arms:       Cervical back: Normal range of motion and neck supple.   Lymphadenopathy:      Cervical: No cervical adenopathy.   Skin:     General: Skin is warm and dry.      Findings: No erythema or rash.   Neurological:      Mental Status: He is alert and oriented to person, place, and time.   Psychiatric:         Behavior: Behavior normal.         Thought Content: Thought content normal.         Judgment: Judgment normal.

## 2024-11-25 NOTE — ASSESSMENT & PLAN NOTE
Wt Readings from Last 3 Encounters:   11/25/24 84.4 kg (186 lb)     -referral to cardiology   -ECHO ASAP   -Restart spironolactone, and Entresto          Orders:    Ambulatory Referral to Cardiology; Future    Echo complete w/ contrast if indicated; Future

## 2024-11-25 NOTE — ASSESSMENT & PLAN NOTE
Depression Screening Follow-up Plan: Patient's depression screening was positive with a PHQ-2 score of 3. Their PHQ-9 score was 13. Patient assessed for underlying major depression. They have no active suicidal ideations. Brief counseling provided and recommend additional follow-up/re-evaluation next office visit.

## 2024-11-25 NOTE — ASSESSMENT & PLAN NOTE
Orders:    Ambulatory Referral to Cardiology; Future    Echo complete w/ contrast if indicated; Future    POCT ECG

## 2024-12-04 LAB — COLOGUARD RESULT REPORTABLE: NORMAL

## 2024-12-06 ENCOUNTER — HOSPITAL ENCOUNTER (OUTPATIENT)
Dept: RADIOLOGY | Facility: HOSPITAL | Age: 54
End: 2024-12-06
Payer: COMMERCIAL

## 2024-12-06 ENCOUNTER — RESULTS FOLLOW-UP (OUTPATIENT)
Age: 54
End: 2024-12-06

## 2024-12-06 DIAGNOSIS — M25.511 CHRONIC RIGHT SHOULDER PAIN: ICD-10-CM

## 2024-12-06 DIAGNOSIS — G89.29 CHRONIC RIGHT SHOULDER PAIN: ICD-10-CM

## 2024-12-06 DIAGNOSIS — R22.32 MASS OF SKIN OF LEFT THUMB: ICD-10-CM

## 2024-12-06 PROCEDURE — 73030 X-RAY EXAM OF SHOULDER: CPT

## 2024-12-06 PROCEDURE — 73130 X-RAY EXAM OF HAND: CPT

## 2024-12-16 ENCOUNTER — OFFICE VISIT (OUTPATIENT)
Age: 54
End: 2024-12-16
Payer: COMMERCIAL

## 2024-12-16 VITALS
WEIGHT: 179.4 LBS | DIASTOLIC BLOOD PRESSURE: 84 MMHG | BODY MASS INDEX: 28.83 KG/M2 | SYSTOLIC BLOOD PRESSURE: 120 MMHG | HEIGHT: 66 IN | OXYGEN SATURATION: 99 % | TEMPERATURE: 98.1 F | HEART RATE: 70 BPM

## 2024-12-16 DIAGNOSIS — E78.5 HYPERLIPIDEMIA, UNSPECIFIED HYPERLIPIDEMIA TYPE: ICD-10-CM

## 2024-12-16 DIAGNOSIS — M19.011 ARTHRITIS OF RIGHT SHOULDER REGION: ICD-10-CM

## 2024-12-16 DIAGNOSIS — F33.41 RECURRENT MAJOR DEPRESSIVE DISORDER, IN PARTIAL REMISSION (HCC): ICD-10-CM

## 2024-12-16 DIAGNOSIS — E11.69 TYPE 2 DIABETES MELLITUS WITH OTHER SPECIFIED COMPLICATION, WITHOUT LONG-TERM CURRENT USE OF INSULIN (HCC): Primary | Chronic | ICD-10-CM

## 2024-12-16 DIAGNOSIS — M79.645 THUMB PAIN, LEFT: ICD-10-CM

## 2024-12-16 DIAGNOSIS — I50.9 HEART FAILURE, UNSPECIFIED HF CHRONICITY, UNSPECIFIED HEART FAILURE TYPE (HCC): ICD-10-CM

## 2024-12-16 DIAGNOSIS — I21.9 MYOCARDIAL INFARCTION, UNSPECIFIED MI TYPE, UNSPECIFIED ARTERY (HCC): Chronic | ICD-10-CM

## 2024-12-16 DIAGNOSIS — I10 PRIMARY HYPERTENSION: ICD-10-CM

## 2024-12-16 LAB
CREAT UR-MCNC: 88.1 MG/DL
MICROALBUMIN UR-MCNC: 9.6 MG/L
MICROALBUMIN/CREAT 24H UR: 11 MG/G CREATININE (ref 0–30)
SL AMB POCT HEMOGLOBIN AIC: 11.1 (ref ?–6.5)

## 2024-12-16 PROCEDURE — 99214 OFFICE O/P EST MOD 30 MIN: CPT | Performed by: NURSE PRACTITIONER

## 2024-12-16 PROCEDURE — 83036 HEMOGLOBIN GLYCOSYLATED A1C: CPT | Performed by: NURSE PRACTITIONER

## 2024-12-16 PROCEDURE — 82043 UR ALBUMIN QUANTITATIVE: CPT | Performed by: NURSE PRACTITIONER

## 2024-12-16 PROCEDURE — 82570 ASSAY OF URINE CREATININE: CPT | Performed by: NURSE PRACTITIONER

## 2024-12-16 RX ORDER — EZETIMIBE 10 MG/1
10 TABLET ORAL DAILY
Qty: 30 TABLET | Refills: 1 | Status: SHIPPED | OUTPATIENT
Start: 2024-12-16

## 2024-12-16 RX ORDER — ROSUVASTATIN CALCIUM 40 MG/1
40 TABLET, COATED ORAL DAILY
Qty: 30 TABLET | Refills: 1 | Status: SHIPPED | OUTPATIENT
Start: 2024-12-16

## 2024-12-16 RX ORDER — LANCETS 33 GAUGE
EACH MISCELLANEOUS
Qty: 200 EACH | Refills: 3 | Status: SHIPPED | OUTPATIENT
Start: 2024-12-16

## 2024-12-16 RX ORDER — BLOOD-GLUCOSE METER
KIT MISCELLANEOUS
Qty: 1 KIT | Refills: 0 | Status: SHIPPED | OUTPATIENT
Start: 2024-12-16

## 2024-12-16 RX ORDER — GLIPIZIDE 10 MG/1
10 TABLET, FILM COATED, EXTENDED RELEASE ORAL DAILY
Qty: 100 TABLET | Refills: 1 | Status: SHIPPED | OUTPATIENT
Start: 2024-12-16

## 2024-12-16 RX ORDER — BLOOD SUGAR DIAGNOSTIC
STRIP MISCELLANEOUS
Qty: 200 EACH | Refills: 3 | Status: SHIPPED | OUTPATIENT
Start: 2024-12-16

## 2024-12-16 RX ORDER — ASPIRIN 81 MG/1
81 TABLET ORAL DAILY
Qty: 30 TABLET | Refills: 1 | Status: SHIPPED | OUTPATIENT
Start: 2024-12-16

## 2024-12-16 RX ORDER — CARVEDILOL 25 MG/1
25 TABLET ORAL 2 TIMES DAILY WITH MEALS
Qty: 60 TABLET | Refills: 1 | Status: SHIPPED | OUTPATIENT
Start: 2024-12-16

## 2024-12-16 RX ORDER — SACUBITRIL AND VALSARTAN 97; 103 MG/1; MG/1
1 TABLET, FILM COATED ORAL 2 TIMES DAILY
Qty: 60 TABLET | Refills: 1 | Status: SHIPPED | OUTPATIENT
Start: 2024-12-16

## 2024-12-16 RX ORDER — SPIRONOLACTONE 25 MG/1
25 TABLET ORAL DAILY
Qty: 30 TABLET | Refills: 1 | Status: SHIPPED | OUTPATIENT
Start: 2024-12-16

## 2024-12-16 NOTE — ASSESSMENT & PLAN NOTE
-controlled   -Discussed risks of uncontrolled hypertension  -continue Coreg as well as Entresto  Continue to monitor blood pressure at home.  Goal BP is < 130/80.  Contact our office for consistent elevations.  Recommend low sodium diet.  Exercise 30 minutes 5 times a week as tolerated.  Recommend yearly eye exam.

## 2024-12-16 NOTE — ASSESSMENT & PLAN NOTE
-Referral to cardiology has appointment tomorrow  -Continue aspirin, Entresto and Coreg  Orders:    carvedilol (COREG) 25 mg tablet; Take 1 tablet (25 mg total) by mouth 2 (two) times a day with meals    aspirin (ECOTRIN LOW STRENGTH) 81 mg EC tablet; Take 1 tablet (81 mg total) by mouth daily

## 2024-12-16 NOTE — PROGRESS NOTES
Name: Deb Quintero      : 1970      MRN: [id number]  Encounter Provider: HENRI Castorena  Encounter Date: 2024   Encounter department: [facility]  :  Assessment & Plan  Type 2 diabetes mellitus with other specified complication, without long-term current use of insulin ([facility])    Lab Results   Component Value Date    HGBA1C 11.1 (A) 2024     -Due for eye exam   -Continue metformin and Jardiance, START Glipizide, discussed risks of uncontrolled diabetes, suggest starting insulin however patient defers any injections  -Recommend checking blood sugars twice a day and return back in 1 month for follow up   Patient is to continue to work on diet and exercise.  Limit sugars and carbohydrate intake.    Avoid soda, juice, sweets, cookies, desserts, pasta, bread.   Eat more whole grains, exercised 30 min of cardio at least 3 times a week.  Also recommended daily foot exams to check for sores, and recommended yearly eye exams.    Orders:    Ambulatory Referral to Ophthalmology; Future    POCT hemoglobin A1c    glipiZIDE (GLUCOTROL XL) 10 mg 24 hr tablet; Take 1 tablet (10 mg total) by mouth daily    Blood Glucose Monitoring Suppl (OneTouch Verio Reflect) w/Device KIT; Check blood sugars twice daily. Please substitute with appropriate alternative as covered by patient's insurance. Dx: E11.65    glucose blood (OneTouch Verio) test strip; Check blood sugars twice daily. Please substitute with appropriate alternative as covered by patient's insurance. Dx: E11.65    OneTouch Delica Lancets 33G MISC; Check blood sugars twice daily. Please substitute with appropriate alternative as covered by patient's insurance. Dx: E11.65    metFORMIN (GLUCOPHAGE) 1000 MG tablet; Take 1 tablet (1,000 mg total) by mouth 2 (two) times a day    Empagliflozin (Jardiance) 25 MG TABS; Take 1 tablet (25 mg total) by mouth every morning    Albumin / creatinine urine ratio    Arthritis  of right shoulder region    Orders:    Ambulatory Referral to Orthopedic Surgery; Future    Thumb pain, left    Orders:    Ambulatory Referral to Orthopedic Surgery; Future    Myocardial infarction, unspecified MI type, unspecified artery (HCC)  -Referral to cardiology has appointment tomorrow  -Continue aspirin, Entresto and Coreg  Orders:    carvedilol (COREG) 25 mg tablet; Take 1 tablet (25 mg total) by mouth 2 (two) times a day with meals    aspirin (ECOTRIN LOW STRENGTH) 81 mg EC tablet; Take 1 tablet (81 mg total) by mouth daily    Heart failure, unspecified HF chronicity, unspecified heart failure type (HCC)  Wt Readings from Last 3 Encounters:   12/16/24 81.4 kg (179 lb 6.4 oz)   11/25/24 84.4 kg (186 lb)     --referral to cardiology   -ECHO ASAP   -Restart spironolactone, and Entresto          Orders:    spironolactone (ALDACTONE) 25 mg tablet; Take 1 tablet (25 mg total) by mouth daily    sacubitril-valsartan (Entresto)  MG TABS; Take 1 tablet by mouth 2 (two) times a day    Hyperlipidemia, unspecified hyperlipidemia type  -Due for lipid panel  -Continue Zetia and Crestor  -Recommend healthy lifestyle choices for your cholesterol.  Low fat/low cholesterol diet.  Limit/avoid red meat.  Eat more lean meat - chicken breast, ground turkey, fish.  Exercise 30 mins at least 5 times a week as tolerated.       Orders:    rosuvastatin (CRESTOR) 40 MG tablet; Take 1 tablet (40 mg total) by mouth daily    ezetimibe (ZETIA) 10 mg tablet; Take 1 tablet (10 mg total) by mouth daily    Primary hypertension  -controlled   -Discussed risks of uncontrolled hypertension  -continue Coreg as well as Entresto  Continue to monitor blood pressure at home.  Goal BP is < 130/80.  Contact our office for consistent elevations.  Recommend low sodium diet.  Exercise 30 minutes 5 times a week as tolerated.  Recommend yearly eye exam.            Recurrent major depressive disorder, in partial remission (HCC)  -Continue to  monitor           BMI Counseling: Body mass index is 29.35 kg/m². The BMI is above normal. Nutrition recommendations include reducing portion sizes, decreasing overall calorie intake, 3-5 servings of fruits/vegetables daily, and reducing fast food intake. Exercise recommendations include moderate aerobic physical activity for 150 minutes/week.    BMI Counseling: Body mass index is 29.35 kg/m². The BMI is above normal. Exercise recommendations include exercising 3-5 times per week.       History of Present Illness     Patient presents today for follow up.   At last office visit patient was advised to restart all medications prior to that he had not been taking his medications due to constipation  Did not have blood work completed prior to office visit today  Moved to the area about 2.5 years ago      He reports that he has been dizzy for 1 day and had stopped taking 2 of his pills, he cannot recall the names of the pills except states that they are pink pills, he reports he will call the office back after he gets home to notify us of the medication she is not taking    Heart failure, unspecified (HCC)-has not had recent Echo, last Echo 2022, does endorse shortness of breath,  had ICD placement, was advised at last office visit to restart Entresto and spironolactone  Echo has been ordered and scheduled, patient was advised to schedule consult with cardiology he has not done so yet       MI (myocardial infarction)-2022, s/p CABG x3 2023, he reports chest pain when he goes up steps with SOB, has SOB with ambulation, he was advised at last office visit to restart aspirin Coreg Zetia, Crestor and Entresto    Primary hypertension- controlled on Entresto, Coreg and Aldactone    Type 2 diabetes mellitus with other specified complication (HCC)-was previously on metformin, at last office visit he was advised to restart metformin and Jardiance, he reports that he is compliant with these medications, hemoglobin A1c 11.1, he does  "not check his blood sugars at home, and states he will not do injections as \"I lose too much weight \", we discussed the risks of uncontrolled diabetes    Reports right shoulder pain, reports decreased ROM, denies injury, started in March   Xray right shoulder:  Mild to moderate degenerative change in the glenohumeral joint.  Acromioclavicular arthrosis.  No fracture or dislocation      Has a mass to his left thumb, reports pain, denies injury, 4-5 months   X-ray:  There are atherosclerotic calcifications. Soft tissues are otherwise unremarkable.     IMPRESSION:        No acute osseous abnormality.      Tobacco abuse-denies  Alcohol use-denies  Illegal drug use-denies     Colon cancer screening-completed Cologuard   Prostate cancer screening-denies          Review of Systems   Constitutional:  Negative for activity change, appetite change, chills, diaphoresis and fever.   HENT:  Negative for congestion, ear discharge, ear pain, postnasal drip, rhinorrhea, sinus pressure, sinus pain and sore throat.    Eyes:  Negative for pain, discharge, itching and visual disturbance.   Respiratory:  Negative for cough, chest tightness, shortness of breath and wheezing.    Cardiovascular:  Negative for chest pain, palpitations and leg swelling.   Gastrointestinal:  Negative for abdominal pain, constipation, diarrhea, nausea and vomiting.   Endocrine: Negative for polydipsia, polyphagia and polyuria.   Genitourinary:  Negative for difficulty urinating, dysuria and urgency.   Musculoskeletal:  Positive for arthralgias. Negative for back pain and neck pain.   Skin:  Negative for rash and wound.   Neurological:  Positive for dizziness. Negative for weakness, numbness and headaches.       Objective   /84 (BP Location: Left arm, Patient Position: Sitting, Cuff Size: Standard)   Pulse 70   Temp 98.1 °F (36.7 °C) (Temporal)   Ht 5' 5.55\" (1.665 m)   Wt 81.4 kg (179 lb 6.4 oz)   SpO2 99%   BMI 29.35 kg/m²      Physical " Exam  Constitutional:       General: He is not in acute distress.     Appearance: He is well-developed. He is not diaphoretic.   HENT:      Head: Normocephalic and atraumatic.      Right Ear: External ear normal.      Left Ear: External ear normal.      Nose: Nose normal.      Mouth/Throat:      Mouth: Mucous membranes are moist.      Pharynx: No oropharyngeal exudate or posterior oropharyngeal erythema.   Eyes:      General:         Right eye: No discharge.         Left eye: No discharge.      Conjunctiva/sclera: Conjunctivae normal.      Pupils: Pupils are equal, round, and reactive to light.   Neck:      Thyroid: No thyromegaly.   Cardiovascular:      Rate and Rhythm: Normal rate and regular rhythm.      Heart sounds: Normal heart sounds. No murmur heard.     No friction rub. No gallop.   Pulmonary:      Effort: Pulmonary effort is normal. No respiratory distress.      Breath sounds: Normal breath sounds. No stridor. No wheezing or rales.   Abdominal:      General: Bowel sounds are normal. There is no distension.      Palpations: Abdomen is soft.      Tenderness: There is no abdominal tenderness.   Musculoskeletal:      Cervical back: Normal range of motion and neck supple.   Lymphadenopathy:      Cervical: No cervical adenopathy.   Skin:     General: Skin is warm and dry.      Findings: No erythema or rash.   Neurological:      Mental Status: He is alert and oriented to person, place, and time.   Psychiatric:         Behavior: Behavior normal.         Thought Content: Thought content normal.         Judgment: Judgment normal.

## 2024-12-16 NOTE — ASSESSMENT & PLAN NOTE
-Due for lipid panel  -Continue Zetia and Crestor  -Recommend healthy lifestyle choices for your cholesterol.  Low fat/low cholesterol diet.  Limit/avoid red meat.  Eat more lean meat - chicken breast, ground turkey, fish.  Exercise 30 mins at least 5 times a week as tolerated.       Orders:    rosuvastatin (CRESTOR) 40 MG tablet; Take 1 tablet (40 mg total) by mouth daily    ezetimibe (ZETIA) 10 mg tablet; Take 1 tablet (10 mg total) by mouth daily

## 2024-12-16 NOTE — ASSESSMENT & PLAN NOTE
Lab Results   Component Value Date    HGBA1C 11.1 (A) 12/16/2024     -Due for eye exam   -Continue metformin and Jardiance, START Glipizide, discussed risks of uncontrolled diabetes, suggest starting insulin however patient defers any injections  -Recommend checking blood sugars twice a day and return back in 1 month for follow up   Patient is to continue to work on diet and exercise.  Limit sugars and carbohydrate intake.    Avoid soda, juice, sweets, cookies, desserts, pasta, bread.   Eat more whole grains, exercised 30 min of cardio at least 3 times a week.  Also recommended daily foot exams to check for sores, and recommended yearly eye exams.    Orders:    Ambulatory Referral to Ophthalmology; Future    POCT hemoglobin A1c    glipiZIDE (GLUCOTROL XL) 10 mg 24 hr tablet; Take 1 tablet (10 mg total) by mouth daily    Blood Glucose Monitoring Suppl (OneTouch Verio Reflect) w/Device KIT; Check blood sugars twice daily. Please substitute with appropriate alternative as covered by patient's insurance. Dx: E11.65    glucose blood (OneTouch Verio) test strip; Check blood sugars twice daily. Please substitute with appropriate alternative as covered by patient's insurance. Dx: E11.65    OneTouch Delica Lancets 33G MISC; Check blood sugars twice daily. Please substitute with appropriate alternative as covered by patient's insurance. Dx: E11.65    metFORMIN (GLUCOPHAGE) 1000 MG tablet; Take 1 tablet (1,000 mg total) by mouth 2 (two) times a day    Empagliflozin (Jardiance) 25 MG TABS; Take 1 tablet (25 mg total) by mouth every morning    Albumin / creatinine urine ratio

## 2024-12-16 NOTE — ASSESSMENT & PLAN NOTE
Wt Readings from Last 3 Encounters:   12/16/24 81.4 kg (179 lb 6.4 oz)   11/25/24 84.4 kg (186 lb)     --referral to cardiology   -ECHO ASAP   -Restart spironolactone, and Entresto          Orders:    spironolactone (ALDACTONE) 25 mg tablet; Take 1 tablet (25 mg total) by mouth daily    sacubitril-valsartan (Entresto)  MG TABS; Take 1 tablet by mouth 2 (two) times a day

## 2024-12-17 ENCOUNTER — TELEPHONE (OUTPATIENT)
Age: 54
End: 2024-12-17

## 2024-12-17 ENCOUNTER — OFFICE VISIT (OUTPATIENT)
Dept: CARDIOLOGY CLINIC | Facility: CLINIC | Age: 54
End: 2024-12-17
Payer: COMMERCIAL

## 2024-12-17 VITALS
OXYGEN SATURATION: 99 % | BODY MASS INDEX: 30.05 KG/M2 | WEIGHT: 187 LBS | DIASTOLIC BLOOD PRESSURE: 84 MMHG | HEIGHT: 66 IN | HEART RATE: 79 BPM | SYSTOLIC BLOOD PRESSURE: 140 MMHG

## 2024-12-17 DIAGNOSIS — I21.A9 OTHER TYPE OF MYOCARDIAL INFARCTION (HCC): Chronic | ICD-10-CM

## 2024-12-17 DIAGNOSIS — I50.89 OTHER HEART FAILURE (HCC): ICD-10-CM

## 2024-12-17 DIAGNOSIS — I25.110 CORONARY ARTERY DISEASE INVOLVING NATIVE CORONARY ARTERY OF NATIVE HEART WITH UNSTABLE ANGINA PECTORIS (HCC): Chronic | ICD-10-CM

## 2024-12-17 DIAGNOSIS — I10 PRIMARY HYPERTENSION: Primary | ICD-10-CM

## 2024-12-17 DIAGNOSIS — E78.00 PURE HYPERCHOLESTEROLEMIA: ICD-10-CM

## 2024-12-17 PROCEDURE — 93000 ELECTROCARDIOGRAM COMPLETE: CPT | Performed by: INTERNAL MEDICINE

## 2024-12-17 PROCEDURE — 99244 OFF/OP CNSLTJ NEW/EST MOD 40: CPT | Performed by: INTERNAL MEDICINE

## 2024-12-17 NOTE — TELEPHONE ENCOUNTER
Spoke with patient . He is going for blood work today. The 2 pills that he is not taking is rosuvastatin and Entresto.

## 2024-12-17 NOTE — PROGRESS NOTES
Cardiology Consultation     Deb Quintero  [id number]  1970  HEART & VASCULAR [facility] [place]  [address]  [place] [id number]  1. Primary hypertension  Pacer post proc eval/prgm    POCT ECG    Lipid panel    Hepatic function panel      2. Other type of myocardial infarction (HCC)  Pacer post proc eval/prgm    POCT ECG    Lipid panel    Hepatic function panel      3. Other heart failure (HCC)  Pacer post proc eval/prgm    POCT ECG    Lipid panel    Hepatic function panel      4. Coronary artery disease involving native coronary artery of native heart with unstable angina pectoris (HCC)  Pacer post proc eval/prgm    POCT ECG    Lipid panel    Hepatic function panel      5. Pure hypercholesterolemia  Pacer post proc eval/prgm    POCT ECG    Lipid panel    Hepatic function panel        Patient Active Problem List   Diagnosis   • Coronary artery disease involving native coronary artery of native heart with unstable angina pectoris (HCC)   • Heart failure, unspecified (HCC)   • MI (myocardial infarction) (HCC)   • Primary hypertension   • Type 2 diabetes mellitus with other specified complication (HCC)   • Hyperlipidemia   • Recurrent major depressive disorder, in partial remission (HCC)     HPI patient is here for a cardiology evaluation. Patient with DM, CAD, CHF and HLD.  Patient with prior Cardiology FU at [facility] with CABG x 3, 11/8/2022.  He had LIMA to the LAD, SVG to OM1 and left RA to PDA.  Postop course was complicated by IABP weaning.  TTE 11/14/2022 demonstrated LVEF of 25 to 30%.  Severe global hypokinesis was noted.  There was mild LAE.  There was no significant valve disease.  Patient had [employer] dual-chamber ICD implant 3/31/2023 in [place], [place].  EKG 3/31/2023 demonstrated NSR with RBBB and IMI of indeterminate age. Repolarization abnormality vs anterolateral  ischemia was noted.  Patient located to the [facility] area 2.5 years ago.  He last saw his cardiologist in April of this year.  He had not been taking his medications until instructed to do so by his PCP 2024.  He is on GDMT in reference to CM.  Echocardiogram was ordered by his PCP and is pending.  EKG demonstrates NSR with RBBB and old IMI with lateral T wave changes with no significant change compared to description of prior.  He has had no chest pain or significant dyspnea.  In reference to family history his paternal uncle  of a heart attack.  His paternal grandfather  of heart attack.  Patient does not smoke.  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  He is considering disability for this.    PMH-  Past Medical History:   Diagnosis Date   • Diabetes mellitus (HCC)    • Heart attack (HCC)    • Hyperlipidemia         SOCIAL HISTORY-  Social History     Socioeconomic History   • Marital status: /Civil Union     Spouse name: Not on file   • Number of children: Not on file   • Years of education: Not on file   • Highest education level: Not on file   Occupational History   • Not on file   Tobacco Use   • Smoking status: Never   • Smokeless tobacco: Never   Vaping Use   • Vaping status: Never Used   Substance and Sexual Activity   • Alcohol use: Yes     Comment: casual   • Drug use: Never   • Sexual activity: Not on file   Other Topics Concern   • Not on file   Social History Narrative   • Not on file     Social Drivers of Health     Financial Resource Strain: Not on file   Food Insecurity: Not on file (2022)   Transportation Needs: Not on file   Physical Activity: Not on file   Stress: Not on file   Social Connections: Not on file   Intimate Partner Violence: Not on file   Housing Stability: Not on file        FAMILY HISTORY-  Family History   Problem Relation Age of Onset   • Hyperlipidemia Mother    • Coronary artery disease Father    • Hyperlipidemia  Father    • No Known Problems Sister    • Hypertension Brother    • Hypertension Brother    • Heart attack Paternal Grandfather        SURGICAL HISTORY-  Past Surgical History:   Procedure Laterality Date   • CARDIAC PACEMAKER PLACEMENT  2023   • CARDIAC SURGERY  11/2023         Current Outpatient Medications:   •  aspirin (ECOTRIN LOW STRENGTH) 81 mg EC tablet, Take 1 tablet (81 mg total) by mouth daily, Disp: 30 tablet, Rfl: 1  •  Blood Glucose Monitoring Suppl (OneTouch Verio Reflect) w/Device KIT, Check blood sugars twice daily. Please substitute with appropriate alternative as covered by patient's insurance. Dx: E11.65, Disp: 1 kit, Rfl: 0  •  carvedilol (COREG) 25 mg tablet, Take 1 tablet (25 mg total) by mouth 2 (two) times a day with meals, Disp: 60 tablet, Rfl: 1  •  Empagliflozin (Jardiance) 25 MG TABS, Take 1 tablet (25 mg total) by mouth every morning, Disp: 30 tablet, Rfl: 1  •  ezetimibe (ZETIA) 10 mg tablet, Take 1 tablet (10 mg total) by mouth daily, Disp: 30 tablet, Rfl: 1  •  glipiZIDE (GLUCOTROL XL) 10 mg 24 hr tablet, Take 1 tablet (10 mg total) by mouth daily, Disp: 100 tablet, Rfl: 1  •  glucose blood (OneTouch Verio) test strip, Check blood sugars twice daily. Please substitute with appropriate alternative as covered by patient's insurance. Dx: E11.65, Disp: 200 each, Rfl: 3  •  metFORMIN (GLUCOPHAGE) 1000 MG tablet, Take 1 tablet (1,000 mg total) by mouth 2 (two) times a day, Disp: 60 tablet, Rfl: 1  •  OneTouch Delica Lancets 33G MISC, Check blood sugars twice daily. Please substitute with appropriate alternative as covered by patient's insurance. Dx: E11.65, Disp: 200 each, Rfl: 3  •  rosuvastatin (CRESTOR) 40 MG tablet, Take 1 tablet (40 mg total) by mouth daily, Disp: 30 tablet, Rfl: 1  •  sacubitril-valsartan (Entresto)  MG TABS, Take 1 tablet by mouth 2 (two) times a day, Disp: 60 tablet, Rfl: 1  •  spironolactone (ALDACTONE) 25 mg tablet, Take 1 tablet (25 mg total) by mouth  "daily, Disp: 30 tablet, Rfl: 1  No Known Allergies  Vitals:    12/17/24 1450   BP: 140/84   BP Location: Left arm   Patient Position: Sitting   Cuff Size: Standard   Pulse: 79   SpO2: 99%   Weight: 84.8 kg (187 lb)   Height: 5' 5.55\" (1.665 m)         Review of Systems:  Review of Systems   All other systems reviewed and are negative.      Physical Exam:  Physical Exam  Vitals reviewed.   Constitutional:       Appearance: He is well-developed.   HENT:      Head: Normocephalic and atraumatic.   Cardiovascular:      Rate and Rhythm: Normal rate.      Heart sounds: Normal heart sounds.   Pulmonary:      Effort: Pulmonary effort is normal.      Breath sounds: Normal breath sounds.   Musculoskeletal:      Cervical back: Normal range of motion.   Skin:     General: Skin is warm and dry.   Neurological:      Mental Status: He is alert and oriented to person, place, and time.         Discussion/Summary: We will continue his present medical regimen.  I have asked him to be compliant with his medications.  An echocardiogram was ordered and I encouraged him to get this done.  I will arrange surveillance of his device with our pacer department.  I have asked him to call if there is a problem in the interim.  I will see him in follow-up in 6 months and sooner as is necessary.  "

## 2024-12-17 NOTE — PATIENT INSTRUCTIONS
I will continue the patient's present medical regimen.  The patient appears well compensated.  I have asked the patient to call if there is a problem in the interim otherwise I will see the patient in six months time.  Please get blood work done at your convenience.

## 2024-12-17 NOTE — TELEPHONE ENCOUNTER
Please call and advise patient that he can discuss with cardiology today, could consider changing Crestor to Lipitor to help with dizziness but likely cause of dizziness is the Entresto.

## 2024-12-17 NOTE — TELEPHONE ENCOUNTER
Please call patient and see if he has completed blood work from yesterday, also please call to see if he he knows which 2 pills he was not taking that he reported at exam yesterday.

## 2024-12-23 ENCOUNTER — OFFICE VISIT (OUTPATIENT)
Age: 54
End: 2024-12-23
Payer: COMMERCIAL

## 2024-12-23 ENCOUNTER — APPOINTMENT (OUTPATIENT)
Age: 54
End: 2024-12-23
Payer: COMMERCIAL

## 2024-12-23 ENCOUNTER — APPOINTMENT (OUTPATIENT)
Dept: LAB | Facility: HOSPITAL | Age: 54
End: 2024-12-23
Payer: COMMERCIAL

## 2024-12-23 VITALS — BODY MASS INDEX: 30.05 KG/M2 | HEIGHT: 66 IN | WEIGHT: 187 LBS

## 2024-12-23 DIAGNOSIS — G89.29 CHRONIC RIGHT SHOULDER PAIN: ICD-10-CM

## 2024-12-23 DIAGNOSIS — E78.00 PURE HYPERCHOLESTEROLEMIA: ICD-10-CM

## 2024-12-23 DIAGNOSIS — M25.511 CHRONIC RIGHT SHOULDER PAIN: ICD-10-CM

## 2024-12-23 DIAGNOSIS — M79.645 THUMB PAIN, LEFT: ICD-10-CM

## 2024-12-23 DIAGNOSIS — Z12.5 SCREENING FOR PROSTATE CANCER: ICD-10-CM

## 2024-12-23 DIAGNOSIS — I21.A9 OTHER TYPE OF MYOCARDIAL INFARCTION (HCC): ICD-10-CM

## 2024-12-23 DIAGNOSIS — I25.110 CORONARY ARTERY DISEASE INVOLVING NATIVE CORONARY ARTERY OF NATIVE HEART WITH UNSTABLE ANGINA PECTORIS (HCC): ICD-10-CM

## 2024-12-23 DIAGNOSIS — Z13.228 SCREENING FOR METABOLIC DISORDER: ICD-10-CM

## 2024-12-23 DIAGNOSIS — M19.011 ARTHRITIS OF RIGHT SHOULDER REGION: ICD-10-CM

## 2024-12-23 DIAGNOSIS — I50.89 OTHER HEART FAILURE (HCC): ICD-10-CM

## 2024-12-23 DIAGNOSIS — G89.29 CHRONIC RIGHT SHOULDER PAIN: Primary | ICD-10-CM

## 2024-12-23 DIAGNOSIS — M25.511 CHRONIC RIGHT SHOULDER PAIN: Primary | ICD-10-CM

## 2024-12-23 DIAGNOSIS — I10 PRIMARY HYPERTENSION: ICD-10-CM

## 2024-12-23 LAB
ALBUMIN SERPL BCG-MCNC: 4.4 G/DL (ref 3.5–5)
ALP SERPL-CCNC: 85 U/L (ref 34–104)
ALT SERPL W P-5'-P-CCNC: 13 U/L (ref 7–52)
ANION GAP SERPL CALCULATED.3IONS-SCNC: 7 MMOL/L (ref 4–13)
AST SERPL W P-5'-P-CCNC: 12 U/L (ref 13–39)
BASOPHILS # BLD AUTO: 0.06 THOUSANDS/ÂΜL (ref 0–0.1)
BASOPHILS NFR BLD AUTO: 1 % (ref 0–1)
BILIRUB DIRECT SERPL-MCNC: 0.08 MG/DL (ref 0–0.2)
BILIRUB SERPL-MCNC: 0.4 MG/DL (ref 0.2–1)
BUN SERPL-MCNC: 14 MG/DL (ref 5–25)
CALCIUM SERPL-MCNC: 9.8 MG/DL (ref 8.4–10.2)
CHLORIDE SERPL-SCNC: 104 MMOL/L (ref 96–108)
CHOLEST SERPL-MCNC: 138 MG/DL (ref ?–200)
CO2 SERPL-SCNC: 30 MMOL/L (ref 21–32)
CREAT SERPL-MCNC: 1.17 MG/DL (ref 0.6–1.3)
EOSINOPHIL # BLD AUTO: 0.1 THOUSAND/ÂΜL (ref 0–0.61)
EOSINOPHIL NFR BLD AUTO: 1 % (ref 0–6)
ERYTHROCYTE [DISTWIDTH] IN BLOOD BY AUTOMATED COUNT: 12.2 % (ref 11.6–15.1)
EST. AVERAGE GLUCOSE BLD GHB EST-MCNC: 275 MG/DL
GFR SERPL CREATININE-BSD FRML MDRD: 69 ML/MIN/1.73SQ M
GLUCOSE P FAST SERPL-MCNC: 204 MG/DL (ref 65–99)
HBA1C MFR BLD: 11.2 %
HCT VFR BLD AUTO: 43.9 % (ref 36.5–49.3)
HDLC SERPL-MCNC: 28 MG/DL
HGB BLD-MCNC: 14.5 G/DL (ref 12–17)
IMM GRANULOCYTES # BLD AUTO: 0.02 THOUSAND/UL (ref 0–0.2)
IMM GRANULOCYTES NFR BLD AUTO: 0 % (ref 0–2)
LDLC SERPL CALC-MCNC: 68 MG/DL (ref 0–100)
LYMPHOCYTES # BLD AUTO: 2.37 THOUSANDS/ÂΜL (ref 0.6–4.47)
LYMPHOCYTES NFR BLD AUTO: 28 % (ref 14–44)
MCH RBC QN AUTO: 28 PG (ref 26.8–34.3)
MCHC RBC AUTO-ENTMCNC: 33 G/DL (ref 31.4–37.4)
MCV RBC AUTO: 85 FL (ref 82–98)
MONOCYTES # BLD AUTO: 0.64 THOUSAND/ÂΜL (ref 0.17–1.22)
MONOCYTES NFR BLD AUTO: 8 % (ref 4–12)
NEUTROPHILS # BLD AUTO: 5.16 THOUSANDS/ÂΜL (ref 1.85–7.62)
NEUTS SEG NFR BLD AUTO: 62 % (ref 43–75)
NONHDLC SERPL-MCNC: 110 MG/DL
NRBC BLD AUTO-RTO: 0 /100 WBCS
PLATELET # BLD AUTO: 267 THOUSANDS/UL (ref 149–390)
PMV BLD AUTO: 9.8 FL (ref 8.9–12.7)
POTASSIUM SERPL-SCNC: 4.8 MMOL/L (ref 3.5–5.3)
PROT SERPL-MCNC: 8.1 G/DL (ref 6.4–8.4)
PSA SERPL-MCNC: 0.52 NG/ML (ref 0–4)
RBC # BLD AUTO: 5.18 MILLION/UL (ref 3.88–5.62)
SODIUM SERPL-SCNC: 141 MMOL/L (ref 135–147)
TRIGL SERPL-MCNC: 212 MG/DL (ref ?–150)
WBC # BLD AUTO: 8.35 THOUSAND/UL (ref 4.31–10.16)

## 2024-12-23 PROCEDURE — 80053 COMPREHEN METABOLIC PANEL: CPT

## 2024-12-23 PROCEDURE — G0103 PSA SCREENING: HCPCS

## 2024-12-23 PROCEDURE — 73030 X-RAY EXAM OF SHOULDER: CPT

## 2024-12-23 PROCEDURE — 99204 OFFICE O/P NEW MOD 45 MIN: CPT | Performed by: ORTHOPAEDIC SURGERY

## 2024-12-23 PROCEDURE — 82248 BILIRUBIN DIRECT: CPT

## 2024-12-23 PROCEDURE — 36415 COLL VENOUS BLD VENIPUNCTURE: CPT

## 2024-12-23 PROCEDURE — 20610 DRAIN/INJ JOINT/BURSA W/O US: CPT | Performed by: ORTHOPAEDIC SURGERY

## 2024-12-23 RX ORDER — LIDOCAINE HYDROCHLORIDE 10 MG/ML
4 INJECTION, SOLUTION INFILTRATION; PERINEURAL
Status: COMPLETED | OUTPATIENT
Start: 2024-12-23 | End: 2024-12-23

## 2024-12-23 RX ORDER — TRIAMCINOLONE ACETONIDE 40 MG/ML
40 INJECTION, SUSPENSION INTRA-ARTICULAR; INTRAMUSCULAR
Status: COMPLETED | OUTPATIENT
Start: 2024-12-23 | End: 2024-12-23

## 2024-12-23 RX ADMIN — LIDOCAINE HYDROCHLORIDE 4 ML: 10 INJECTION, SOLUTION INFILTRATION; PERINEURAL at 13:00

## 2024-12-23 RX ADMIN — TRIAMCINOLONE ACETONIDE 40 MG: 40 INJECTION, SUSPENSION INTRA-ARTICULAR; INTRAMUSCULAR at 13:00

## 2024-12-23 NOTE — PROGRESS NOTES
REASON FOR VISIT  Chief Complaint   Patient presents with    Right Shoulder - Pain     NKI, started hurting since March.         HPI: Deb Quintero is a 54 y.o. year old right hand dominant male who presents with RIGHT shoulder pain.    March 2024 pain started    No particular injury  Pain located deep and diffuse  Worse trying to throw or reach   Takes Tylenol without much relief        Past Medical and Surgical History:  Past Medical History:   Diagnosis Date    Diabetes mellitus (HCC)     Heart attack (HCC) 2023    Hyperlipidemia        Past Surgical History:   Procedure Laterality Date    CARDIAC PACEMAKER PLACEMENT  2023    CARDIAC SURGERY  11/2023       Medications:    Current Outpatient Medications:     aspirin (ECOTRIN LOW STRENGTH) 81 mg EC tablet, Take 1 tablet (81 mg total) by mouth daily, Disp: 30 tablet, Rfl: 1    Blood Glucose Monitoring Suppl (OneTouch Verio Reflect) w/Device KIT, Check blood sugars twice daily. Please substitute with appropriate alternative as covered by patient's insurance. Dx: E11.65, Disp: 1 kit, Rfl: 0    carvedilol (COREG) 25 mg tablet, Take 1 tablet (25 mg total) by mouth 2 (two) times a day with meals, Disp: 60 tablet, Rfl: 1    Empagliflozin (Jardiance) 25 MG TABS, Take 1 tablet (25 mg total) by mouth every morning, Disp: 30 tablet, Rfl: 1    ezetimibe (ZETIA) 10 mg tablet, Take 1 tablet (10 mg total) by mouth daily, Disp: 30 tablet, Rfl: 1    glipiZIDE (GLUCOTROL XL) 10 mg 24 hr tablet, Take 1 tablet (10 mg total) by mouth daily, Disp: 100 tablet, Rfl: 1    glucose blood (OneTouch Verio) test strip, Check blood sugars twice daily. Please substitute with appropriate alternative as covered by patient's insurance. Dx: E11.65, Disp: 200 each, Rfl: 3    metFORMIN (GLUCOPHAGE) 1000 MG tablet, Take 1 tablet (1,000 mg total) by mouth 2 (two) times a day, Disp: 60 tablet, Rfl: 1    OneTouch Delica Lancets 33G MISC, Check blood sugars twice daily. Please substitute with appropriate  alternative as covered by patient's insurance. Dx: E11.65, Disp: 200 each, Rfl: 3    rosuvastatin (CRESTOR) 40 MG tablet, Take 1 tablet (40 mg total) by mouth daily, Disp: 30 tablet, Rfl: 1    sacubitril-valsartan (Entresto)  MG TABS, Take 1 tablet by mouth 2 (two) times a day, Disp: 60 tablet, Rfl: 1    spironolactone (ALDACTONE) 25 mg tablet, Take 1 tablet (25 mg total) by mouth daily, Disp: 30 tablet, Rfl: 1    Allergies:  No Known Allergies      PE:  Pertinent Positive Findings in shoulder exam:    Motion (AROM-PROM):    Forward Flexion R: 140 L : 160   Abduction: R: 140 L: 140   External Rotation: R: 60 L : 60  Internal rotation Adduction: R: L5 L: T10  ABduction/ER: Right 80@ 90 degrees, Left: 80@ 90 degrees   Abduction/lR: Right 30@ 90 degrees, Left: 80@ 90 degrees      RIGHT shoulder:    Supraspinatus strength 5/5   lnfraspinatus strength 5/5   Tenderness [] AC joint [] Biceps Groove [] None   Cross body adduction [] Positive [] Negative   [place]' [x] Positive [] Negative   Neer's [x] Positive [] Negative   Empty Can [] Positive [x] Negative   ER lag [] Positive [x] Negative   Horn blower's [] Positive [x] Negative   Belly Press [] Positive [x] Negative   Lift Off [] Positive [x] Negative   Bear Hug [] Positive [x] Negative   Lexa's sign [x] Positive [] Negative   Speed's sign [x] Positive [] Negative     Sensory: Intact sensation in axillary, lateral antebrachial cutaneous, median, superficial branch radial, and ulnar nerve distributions.    Vascular exam: warm and well perfused digits. Skin: intact, no rashes or lesions.      Radiology: I independently reviewed and interpreted the images.  Radiographs: RIGHT shoulder X-Ray: moderate AC joint arthritis and GH joint arthritis       Assessment/ Plan: Deb Quintero is a 54 y.o.year old right hand dominant male who presents with RIGHT shoulder arthritis, cuff tendinitis     Hx of MI, pacemaker, DM    I discussed treatment options  Non surgical  treatment  PT script  Cannot do nsaids or medrol    Injection RIGHT shoulder today   Follow up 3-4 months       Large joint arthrocentesis: R subacromial bursa  Universal Protocol:  Consent: Verbal consent obtained.  Consent given by: patient  Supporting Documentation  Indications: pain   Procedure Details  Location: shoulder - R subacromial bursa  Needle size: 22 G  Approach: lateral  Medications administered: 4 mL lidocaine 1 %; 40 mg triamcinolone acetonide 40 mg/mL    Patient tolerance: patient tolerated the procedure well with no immediate complications            CC:  No primary care provider on file. Marva Willis, CR*

## 2024-12-29 LAB — COLOGUARD RESULT REPORTABLE: NEGATIVE

## 2025-01-16 ENCOUNTER — TELEPHONE (OUTPATIENT)
Dept: CARDIOLOGY CLINIC | Facility: CLINIC | Age: 55
End: 2025-01-16

## 2025-01-23 ENCOUNTER — TELEPHONE (OUTPATIENT)
Age: 55
End: 2025-01-23

## 2025-03-03 ENCOUNTER — IN-CLINIC DEVICE VISIT (OUTPATIENT)
Dept: CARDIOLOGY CLINIC | Facility: CLINIC | Age: 55
End: 2025-03-03
Payer: COMMERCIAL

## 2025-03-03 DIAGNOSIS — Z95.810 AICD (AUTOMATIC CARDIOVERTER/DEFIBRILLATOR) PRESENT: Primary | ICD-10-CM

## 2025-03-03 PROCEDURE — 93283 PRGRMG EVAL IMPLANTABLE DFB: CPT | Performed by: INTERNAL MEDICINE

## 2025-03-03 NOTE — PROGRESS NOTES
Results for orders placed or performed in visit on 03/03/25   Cardiac EP device report    Narrative    [employer] DUAL ICD/ ACTIVE SYSTEM IS MRI CONDITIONAL  DEVICE INTERROGATED IN THE [place] OFFICE. PT NEW TO DEVICE CLINIC. BATTERY VOLTAGE ADEQUATE (12 YRS). AP<1%, <1%. ALL LEAD PARAMETERS WITHIN NORMAL LIMITS. MULTIPLE VHR EPISODES @ 160-178 BPM MAX DURATION 2.6 MINS- ST ON AVAILABLE EGM'S. PT STATED HE MAY HAVE BEEN PLAYING BALL OR WITH SON DURING THOSE TIMES. PT ASYMPTOMATIC. PT ON ASA 81MG & CARVEDILOL. HEARTLOGIC HEART FAILURE INDEX WITHIN NORMAL LIMITS. NORMAL DEVICE FUNCTION. GV

## 2025-03-04 ENCOUNTER — RESULTS FOLLOW-UP (OUTPATIENT)
Dept: NON INVASIVE DIAGNOSTICS | Facility: HOSPITAL | Age: 55
End: 2025-03-04

## 2025-03-14 ENCOUNTER — OFFICE VISIT (OUTPATIENT)
Age: 55
End: 2025-03-14
Payer: COMMERCIAL

## 2025-03-14 VITALS
SYSTOLIC BLOOD PRESSURE: 120 MMHG | HEIGHT: 66 IN | DIASTOLIC BLOOD PRESSURE: 84 MMHG | OXYGEN SATURATION: 96 % | BODY MASS INDEX: 28.99 KG/M2 | HEART RATE: 75 BPM | WEIGHT: 180.4 LBS | TEMPERATURE: 97.8 F

## 2025-03-14 DIAGNOSIS — E78.5 HYPERLIPIDEMIA, UNSPECIFIED HYPERLIPIDEMIA TYPE: ICD-10-CM

## 2025-03-14 DIAGNOSIS — Z13.228 SCREENING FOR METABOLIC DISORDER: ICD-10-CM

## 2025-03-14 DIAGNOSIS — Z79.4 TYPE 2 DIABETES MELLITUS WITH OTHER SPECIFIED COMPLICATION, WITH LONG-TERM CURRENT USE OF INSULIN (HCC): Primary | Chronic | ICD-10-CM

## 2025-03-14 DIAGNOSIS — F33.41 RECURRENT MAJOR DEPRESSIVE DISORDER, IN PARTIAL REMISSION (HCC): ICD-10-CM

## 2025-03-14 DIAGNOSIS — R73.01 ELEVATED FASTING GLUCOSE: ICD-10-CM

## 2025-03-14 DIAGNOSIS — I21.9 MYOCARDIAL INFARCTION, UNSPECIFIED MI TYPE, UNSPECIFIED ARTERY (HCC): Chronic | ICD-10-CM

## 2025-03-14 DIAGNOSIS — Z00.00 ANNUAL PHYSICAL EXAM: ICD-10-CM

## 2025-03-14 DIAGNOSIS — E11.69 TYPE 2 DIABETES MELLITUS WITH OTHER SPECIFIED COMPLICATION, WITH LONG-TERM CURRENT USE OF INSULIN (HCC): Primary | Chronic | ICD-10-CM

## 2025-03-14 DIAGNOSIS — I50.9 HEART FAILURE, UNSPECIFIED HF CHRONICITY, UNSPECIFIED HEART FAILURE TYPE (HCC): ICD-10-CM

## 2025-03-14 DIAGNOSIS — I25.110 CORONARY ARTERY DISEASE INVOLVING NATIVE CORONARY ARTERY OF NATIVE HEART WITH UNSTABLE ANGINA PECTORIS (HCC): Chronic | ICD-10-CM

## 2025-03-14 DIAGNOSIS — M25.551 RIGHT HIP PAIN: ICD-10-CM

## 2025-03-14 DIAGNOSIS — I10 PRIMARY HYPERTENSION: ICD-10-CM

## 2025-03-14 PROCEDURE — 99396 PREV VISIT EST AGE 40-64: CPT | Performed by: NURSE PRACTITIONER

## 2025-03-14 PROCEDURE — 99214 OFFICE O/P EST MOD 30 MIN: CPT | Performed by: NURSE PRACTITIONER

## 2025-03-14 RX ORDER — ERGOCALCIFEROL 1.25 MG/1
CAPSULE ORAL WEEKLY
COMMUNITY

## 2025-03-14 RX ORDER — ATORVASTATIN CALCIUM 40 MG/1
40 TABLET, FILM COATED ORAL DAILY
COMMUNITY
End: 2025-03-14

## 2025-03-14 NOTE — ASSESSMENT & PLAN NOTE
Lab Results   Component Value Date    HGBA1C 11.2 (H) 12/23/2024   -up to date with eye exam  -Referral to endocrine  -Continue metformin and Jardiance, continue Glipizide, discussed risks of uncontrolled diabetes, suggest starting insulin however patient defers any injections  -Recommend checking blood sugars twice a day and return back in 1 month for follow up   Patient is to continue to work on diet and exercise.  Limit sugars and carbohydrate intake.    Avoid soda, juice, sweets, cookies, desserts, pasta, bread.   Eat more whole grains, exercised 30 min of cardio at least 3 times a week.  Also recommended daily foot exams to check for sores, and recommended yearly eye exams.    Orders:    Ambulatory Referral to Endocrinology; Future    CBC and differential; Future    
-Continue to follow with cardiology         
-Continue to follow with cardiology  -Continue aspirin, Entresto and Coreg         
-Continue to monitor           
-Due for lipid panel  -Continue Zetia and Crestor  -Recommend healthy lifestyle choices for your cholesterol.  Low fat/low cholesterol diet.  Limit/avoid red meat.  Eat more lean meat - chicken breast, ground turkey, fish.  Exercise 30 mins at least 5 times a week as tolerated.       Orders:    Lipid panel      
-controlled   -Discussed risks of uncontrolled hypertension  -continue Coreg as well as Entresto  Continue to monitor blood pressure at home.  Goal BP is < 130/80.  Contact our office for consistent elevations.  Recommend low sodium diet.  Exercise 30 minutes 5 times a week as tolerated.  Recommend yearly eye exam.              
-up to date fasting blood work   -Continue with well-balanced diet, encouraged daily exercise  -He is up-to-date with colon and prostate cancer screening  -Defers any additional vaccination  -Follow-up in 1 year for annual physical or sooner if needed           
Wt Readings from Last 3 Encounters:   03/14/25 81.8 kg (180 lb 6.4 oz)   12/23/24 84.8 kg (187 lb)   12/17/24 84.8 kg (187 lb)     -Continue to follow cardiology  -Continue spironolactone, and Entresto                 
with patient

## 2025-03-14 NOTE — PROGRESS NOTES
Adult Annual Physical  Name: Deb Quintero      : 1970      MRN: [id number]  Encounter Provider: HENRI Castorena  Encounter Date: 3/14/2025   Encounter department: [facility] [place]    Assessment & Plan  Type 2 diabetes mellitus with other specified complication, with long-term current use of insulin (HCC)    Lab Results   Component Value Date    HGBA1C 11.2 (H) 2024   -up to date with eye exam  -Referral to endocrine  -Continue metformin and Jardiance, continue Glipizide, discussed risks of uncontrolled diabetes, suggest starting insulin however patient defers any injections  -Recommend checking blood sugars twice a day and return back in 1 month for follow up   Patient is to continue to work on diet and exercise.  Limit sugars and carbohydrate intake.    Avoid soda, juice, sweets, cookies, desserts, pasta, bread.   Eat more whole grains, exercised 30 min of cardio at least 3 times a week.  Also recommended daily foot exams to check for sores, and recommended yearly eye exams.    Orders:    Ambulatory Referral to Endocrinology; Future    CBC and differential; Future    Right hip pain    Orders:    XR hip/pelv 2-3 vws right if performed; Future    Screening for metabolic disorder    Orders:    Comprehensive metabolic panel    CBC and differential    Lipid panel    Elevated fasting glucose    Orders:    Hemoglobin A1C    Hyperlipidemia, unspecified hyperlipidemia type  -Due for lipid panel  -Continue Zetia and Crestor  -Recommend healthy lifestyle choices for your cholesterol.  Low fat/low cholesterol diet.  Limit/avoid red meat.  Eat more lean meat - chicken breast, ground turkey, fish.  Exercise 30 mins at least 5 times a week as tolerated.       Orders:    Lipid panel      Primary hypertension  -controlled   -Discussed risks of uncontrolled hypertension  -continue Coreg as well as Entresto  Continue to monitor blood pressure at home.  Goal BP is < 130/80.   Contact our office for consistent elevations.  Recommend low sodium diet.  Exercise 30 minutes 5 times a week as tolerated.  Recommend yearly eye exam.              Myocardial infarction, unspecified MI type, unspecified artery (HCC)  -Continue to follow with cardiology  -Continue aspirin, Entresto and Coreg         Heart failure, unspecified HF chronicity, unspecified heart failure type (HCC)  Wt Readings from Last 3 Encounters:   03/14/25 81.8 kg (180 lb 6.4 oz)   12/23/24 84.8 kg (187 lb)   12/17/24 84.8 kg (187 lb)     -Continue to follow cardiology  -Continue spironolactone, and Entresto                 Coronary artery disease involving native coronary artery of native heart with unstable angina pectoris (HCC)  -Continue to follow with cardiology         Recurrent major depressive disorder, in partial remission (HCC)  -Continue to monitor           Annual physical exam  -up to date fasting blood work   -Continue with well-balanced diet, encouraged daily exercise  -He is up-to-date with colon and prostate cancer screening  -Defers any additional vaccination  -Follow-up in 1 year for annual physical or sooner if needed           Preventive Screenings:  - Diabetes Screening: screening not indicated, has diabetes, risks/benefits discussed and screening up-to-date  - Cholesterol Screening: screening not indicated, has hyperlipidemia, risks/benefits discussed and screening up-to-date   - Hepatitis C screening: risks/benefits discussed   - HIV screening: risks/benefits discussed   - Colon cancer screening: screening up-to-date   - Lung cancer screening: screening not indicated   - Prostate cancer screening: screening up-to-date     Immunizations:  - Immunizations due: Influenza, Prevnar 20, Tdap and Zoster (Shingrix)  - Risks/benefits immunizations discussed    - The patient declines recommended vaccines currently despite my recommendations      Counseling/Anticipatory Guidance:  - Alcohol: discussed moderation in  alcohol intake and recommendations for healthy alcohol use.   - Drug use: discussed harms of illicit drug use and how it can negatively impact mental/physical health.   - Tobacco use: discussed harms of tobacco use and management options for quitting.   - Dental health: discussed importance of regular tooth brushing, flossing, and dental visits.   - Sexual health: discussed sexually transmitted diseases, partner selection, use of condoms, avoidance of unintended pregnancy, and contraceptive alternatives.   - Diet: discussed recommendations for a healthy/well-balanced diet.   - Exercise: the importance of regular exercise/physical activity was discussed. Recommend exercise 3-5 times per week for at least 30 minutes.   - Injury prevention: discussed safety/seat belts, safety helmets, smoke detectors, carbon monoxide detectors, and smoking near bedding or upholstery.          History of Present Illness     Adult Annual Physical:  Patient presents for annual physical. Patient presents today for follow up and annual physical.  Reviewed blood work from December, patient reports compliance with medications however he is not sure which medications he is currently taking.  Moved to the area about 2.5 years ago      Heart failure, unspecified (HCC)-has had recent echocardiogram, does endorse shortness of breath,  had ICD placement, was advised at last office visit to restart Entresto and spironolactone  He did have follow-up with cardiology       MI (myocardial infarction)-2022, s/p CABG x3 2023, he reports chest pain when he goes up steps with SOB, has SOB with ambulation, he was advised at last office visit to restart aspirin Coreg Zetia, Crestor and Entresto     Primary hypertension- controlled on Entresto, Coreg and Aldactone     Type 2 diabetes mellitus with other specified complication (HCC)-was previously on metformin, at last office visit he was advised to restart metformin and Jardiance, he reports that he is  "compliant with these medications, hemoglobin A1c 11.1-->11.2, was started on glipizide at last office visit however patient is unsure if he is taking this medication he does not check his blood sugars at home, and states he will not do injections as \"I lose too much weight \", we discussed the risks of uncontrolled diabetes     Hyperlipidemia-patient currently taking Crestor and Zetia without side effects    Tobacco abuse-denies  Alcohol use-denies  Illegal drug use-denies      Colon cancer screening-completed Cologrd   Prostate cancer screening-2024  .     Diet and Physical Activity:  - Diet/Nutrition: well balanced diet and poor diet.  - Exercise: 1-2 times a week on average.    General Health:  - Sleep: sleeps well and 7-8 hours of sleep on average.  - Hearing: normal hearing bilateral ears.  - Vision: most recent eye exam < 1 year ago, vision problems and wears glasses.  - Dental: no dental visits for > 1 year.     Health:  - History of STDs: no.   - Urinary symptoms: none.     Advanced Care Planning:  - Has an advanced directive?: no    - Has a durable medical POA?: no    - ACP document given to patient?: no      Review of Systems   Constitutional:  Negative for activity change, appetite change, chills, diaphoresis and fever.   HENT:  Negative for congestion, ear discharge, ear pain, postnasal drip, rhinorrhea, sinus pressure, sinus pain and sore throat.    Eyes:  Negative for pain, discharge, itching and visual disturbance.   Respiratory:  Negative for cough, chest tightness, shortness of breath and wheezing.    Cardiovascular:  Negative for chest pain, palpitations and leg swelling.   Gastrointestinal:  Negative for abdominal pain, blood in stool, constipation, diarrhea, nausea and vomiting.   Endocrine: Negative for polydipsia, polyphagia and polyuria.   Genitourinary:  Negative for difficulty urinating, dysuria, hematuria and urgency.   Musculoskeletal:  Negative for arthralgias, back pain and neck pain. " "  Skin:  Negative for rash and wound.   Neurological:  Negative for dizziness, weakness, numbness and headaches.         Objective   /84 (BP Location: Left arm, Patient Position: Sitting, Cuff Size: Standard)   Pulse 75   Temp 97.8 °F (36.6 °C) (Temporal)   Ht 5' 6\" (1.676 m)   Wt 81.8 kg (180 lb 6.4 oz)   SpO2 96%   BMI 29.12 kg/m²     Physical Exam  Constitutional:       General: He is not in acute distress.     Appearance: He is well-developed. He is not diaphoretic.   HENT:      Head: Normocephalic and atraumatic.      Right Ear: External ear normal.      Left Ear: External ear normal.      Nose: Nose normal.      Mouth/Throat:      Mouth: Mucous membranes are moist.      Pharynx: No oropharyngeal exudate or posterior oropharyngeal erythema.   Eyes:      General:         Right eye: No discharge.         Left eye: No discharge.      Conjunctiva/sclera: Conjunctivae normal.      Pupils: Pupils are equal, round, and reactive to light.   Neck:      Thyroid: No thyromegaly.   Cardiovascular:      Rate and Rhythm: Normal rate and regular rhythm.      Heart sounds: Normal heart sounds. No murmur heard.     No friction rub. No gallop.   Pulmonary:      Effort: Pulmonary effort is normal. No respiratory distress.      Breath sounds: Normal breath sounds. No stridor. No wheezing or rales.   Abdominal:      General: Bowel sounds are normal. There is no distension.      Palpations: Abdomen is soft.      Tenderness: There is no abdominal tenderness.   Musculoskeletal:      Cervical back: Normal range of motion and neck supple.   Lymphadenopathy:      Cervical: No cervical adenopathy.   Skin:     General: Skin is warm and dry.      Findings: No erythema or rash.   Neurological:      Mental Status: He is alert and oriented to person, place, and time.   Psychiatric:         Behavior: Behavior normal.         Thought Content: Thought content normal.         Judgment: Judgment normal.       "

## 2025-03-17 ENCOUNTER — TELEPHONE (OUTPATIENT)
Age: 55
End: 2025-03-17

## 2025-03-17 NOTE — TELEPHONE ENCOUNTER
Patient has STAT/ASAP referral. Please attempt to contact pt a total of 3 times to schedule (once per day). If unable to reach patient or patient declines scheduling, send in-basket message to referring provider to make them aware and close referral.

## 2025-03-17 NOTE — TELEPHONE ENCOUNTER
----- Message from HENRI Barkley sent at 3/14/2025  2:45 PM EDT -----  Call patient to discuss medications and dosages

## 2025-03-18 NOTE — TELEPHONE ENCOUNTER
Spoke with patient he will call the office back with  his list of medications dosage and directions.

## 2025-03-19 ENCOUNTER — TELEPHONE (OUTPATIENT)
Dept: ADMINISTRATIVE | Facility: OTHER | Age: 55
End: 2025-03-19

## 2025-03-19 NOTE — TELEPHONE ENCOUNTER
Upon review of the In Basket request and the patient's chart, initial outreach has been made via fax to facility. Please see Contacts section for details.     Thank you  Jarod Torres MA

## 2025-03-19 NOTE — LETTER
Diabetic Eye Exam Form    Date Requested: 25  Patient: Deb Quintero  Patient : 1970   Referring Provider: HENRI Castorena      DIABETIC Eye Exam Date _______________________________      Type of Exam MUST be documented for Diabetic Eye Exams. Please CHECK ONE.     Retinal Exam       Dilated Retinal Exam       OCT       Optomap-Iris Exam      Fundus Photography       Left Eye - Please check Retinopathy or No Retinopathy        Exam did show retinopathy    Exam did not show retinopathy       Right Eye - Please check Retinopathy or No Retinopathy       Exam did show retinopathy    Exam did not show retinopathy       Comments __________________________________________________________    Practice Providing Exam ______________________________________________    Exam Performed By (print name) _______________________________________      Provider Signature ___________________________________________________      These reports are needed for  compliance.  Please fax this completed form and a copy of the Diabetic Eye Exam report to our office located at [address] [place] [address] as soon as possible via Fax [phone] attention Jarod: Phone [phone]  We thank you for your assistance in treating our mutual patient.

## 2025-03-19 NOTE — TELEPHONE ENCOUNTER
----- Message from Roslyn ZUNIGA sent at 3/18/2025 12:06 PM EDT -----  Regarding: diabetic eye exam  ..03/18/25 12:06 PM    Hello, our patient Deb Quintero has had Diabetic Eye Exam completed/performed. Please assist in updating the patient chart by making an External outreach to [facility] located in [place]. The date of service is 2024.    Thank you,  Roslyn Valdez MA  [facility] [place]

## 2025-03-24 NOTE — TELEPHONE ENCOUNTER
Upon review of the In Basket request we were able to locate, review, and update the patient chart as requested for Diabetic Eye Exam.    Any additional questions or concerns should be emailed to the Practice Liaisons via the appropriate education email address, please do not reply via In Basket.    Thank you  Jarod Torres MA   [facility]

## 2025-05-23 ENCOUNTER — HOSPITAL ENCOUNTER (EMERGENCY)
Facility: HOSPITAL | Age: 55
Discharge: HOME | End: 2025-05-23
Attending: EMERGENCY MEDICINE
Payer: COMMERCIAL

## 2025-05-23 VITALS
SYSTOLIC BLOOD PRESSURE: 229 MMHG | RESPIRATION RATE: 16 BRPM | TEMPERATURE: 97.5 F | DIASTOLIC BLOOD PRESSURE: 112 MMHG | HEART RATE: 80 BPM | OXYGEN SATURATION: 98 %

## 2025-05-23 DIAGNOSIS — T15.02XA ACUTE FOREIGN BODY OF LEFT CORNEA, INITIAL ENCOUNTER: Primary | ICD-10-CM

## 2025-05-23 PROCEDURE — 99284 EMERGENCY DEPT VISIT MOD MDM: CPT | Performed by: EMERGENCY MEDICINE

## 2025-05-23 PROCEDURE — 90715 TDAP VACCINE 7 YRS/> IM: CPT

## 2025-05-23 PROCEDURE — 90471 IMMUNIZATION ADMIN: CPT

## 2025-05-23 PROCEDURE — 99283 EMERGENCY DEPT VISIT LOW MDM: CPT

## 2025-05-23 RX ORDER — OFLOXACIN 3 MG/ML
1 SOLUTION/ DROPS OPHTHALMIC ONCE
Status: COMPLETED | OUTPATIENT
Start: 2025-05-23 | End: 2025-05-23

## 2025-05-23 RX ORDER — TETRACAINE HYDROCHLORIDE 5 MG/ML
2 SOLUTION OPHTHALMIC ONCE
Status: COMPLETED | OUTPATIENT
Start: 2025-05-23 | End: 2025-05-23

## 2025-05-23 RX ADMIN — TETANUS TOXOID, REDUCED DIPHTHERIA TOXOID AND ACELLULAR PERTUSSIS VACCINE, ADSORBED 0.5 ML: 5; 2.5; 8; 8; 2.5 SUSPENSION INTRAMUSCULAR at 23:41

## 2025-05-23 RX ADMIN — FLUORESCEIN SODIUM 1 STRIP: 1 STRIP OPHTHALMIC at 22:45

## 2025-05-23 RX ADMIN — OFLOXACIN 1 DROP: 3 SOLUTION OPHTHALMIC at 23:41

## 2025-05-23 RX ADMIN — TETRACAINE HYDROCHLORIDE 2 DROP: 5 SOLUTION OPHTHALMIC at 22:45

## 2025-05-23 NOTE — Clinical Note
Deb Quintero was seen and treated in our emergency department on 5/23/2025.            as tolerated    Diagnosis: ER visit    Deb  may return to work on return date.    He may return on this date: 05/26/2025         If you have any questions or concerns, please don't hesitate to call.      Bubba Willis, DO    ______________________________           _______________          _______________  Hospital Representative                              Date                                Time

## 2025-05-24 NOTE — ED NOTES
Unable to obtain visual acuity, pt has glasses for distance but did not bring them. Dr. Doherty aware and it is okay to not obtain visual acuity.     Lebron Alvarado  05/23/25 0437

## 2025-05-24 NOTE — ED ATTENDING ATTESTATION
5/23/2025  I, Reynold Doherty MD, saw and evaluated the patient. I have discussed the patient with the resident/non-physician practitioner and agree with the resident's/non-physician practitioner's findings, Plan of Care, and MDM as documented in the resident's/non-physician practitioner's note, except where noted. All available labs and Radiology studies were reviewed.  I was present for key portions of any procedure(s) performed by the resident/non-physician practitioner and I was immediately available to provide assistance.       At this point I agree with the current assessment done in the Emergency Department.  I have conducted an independent evaluation of this patient a history and physical is as follows:      Final Diagnosis:  1. Acute foreign body of left cornea, initial encounter      Chief Complaint   Patient presents with    Eye Problem     Pt was grinding steel when a part of it got into his eye. Pt reports wearing reading glasses when this occurred. Pt vision is blurry and eye is red and teary in triage.            A:  - 55-year-old male who presents with foreign body in left eye.      P:  - Unable to remove foreign body with Q-tip or 18-gauge needle.  Will update tetanus.  Start patient on Ocuflox.  Patient told to call ophthalmology in the morning to be seen for foreign body removal.      H:    55-year-old male who presents with foreign body left eye.  Patient was grinding steel when a flake of steel got in his left eye.  Does not wear contact lenses.  Unknown last tetanus.      PMH:  Past Medical History[1]    PSH:  Past Surgical History[2]      PE:   Vitals:    05/23/25 2131 05/23/25 2132   BP:  (!) 229/112   Pulse: 80    Resp: 16    Temp: 97.5 °F (36.4 °C)    TempSrc: Tympanic    SpO2: 98%          Constitutional: Vital signs are normal. He appears well-developed. He is cooperative. No distress.   Eyes: Conjunctivitis left eye.  Small, metallic foreign body anterior left cornea.  Pulmonary/Chest:  Effort normal.   Abdominal: Normal appearance.   Neurological: He is alert.  Skin: Skin is warm, dry and intact.   Psychiatric: He has a normal mood and affect. His speech is normal and behavior is normal. Thought content normal.          - 13 point ROS was performed and all are normal unless stated in the history above.   - Nursing note reviewed. Vitals reviewed.   - Orders placed by myself and/or advanced practitioner / resident.    - Previous chart was reviewed  - No language barrier.   - History obtained from patient.   - There are no limitations to the history obtained. Reasons ROS could not be obtained:  N/A      ED Course as of 05/23/25 2333   Fri May 23, 2025   2308 Unable to remove foreign body with 18-gauge needle.     Medications   ofloxacin (OCUFLOX) 0.3 % ophthalmic solution 1 drop (has no administration in time range)   tetanus-diphtheria-acellular pertussis (BOOSTRIX) IM injection 0.5 mL (has no administration in time range)   fluorescein sodium sterile ophthalmic strip 1 strip (1 strip Both Eyes Given 5/23/25 2245)   tetracaine 0.5 % ophthalmic solution 2 drop (2 drops Both Eyes Given 5/23/25 2245)     No orders to display     Orders Placed This Encounter   Procedures    Ambulatory Referral to Ophthalmology    Visual acuity screening     Labs Reviewed - No data to display  Time reflects when diagnosis was documented in both MDM as applicable and the Disposition within this note       Time User Action Codes Description Comment    5/23/2025 11:06 PM Bubba Willis Add [T15.02XA] Acute foreign body of left cornea, initial encounter           ED Disposition       ED Disposition   Discharge    Condition   Stable    Date/Time   Fri May 23, 2025 11:05 PM    Comment   Deb Quintero discharge to home/self care.                   Follow-up Information       Follow up With Specialties Details Why Contact Info Additional Information    [facility]   [address]  [facility] [address] [phone] [facility] [place], [address], [place]  [address]          Patient's Medications   Discharge Prescriptions    No medications on file       Prior to Admission Medications   Prescriptions Last Dose Informant Patient Reported? Taking?   Blood Glucose Monitoring Suppl (OneTouch Verio Reflect) w/Device KIT  Self No No   Sig: Check blood sugars twice daily. Please substitute with appropriate alternative as covered by patient's insurance. Dx: E11.65   Empagliflozin (Jardiance) 25 MG TABS  Self No No   Sig: Take 1 tablet (25 mg total) by mouth every morning   OneTouch Delica Lancets 33G MISC  Self No No   Sig: Check blood sugars twice daily. Please substitute with appropriate alternative as covered by patient's insurance. Dx: E11.65   aspirin (ECOTRIN LOW STRENGTH) 81 mg EC tablet  Self No No   Sig: Take 1 tablet (81 mg total) by mouth daily   carvedilol (COREG) 25 mg tablet  Self No No   Sig: Take 1 tablet (25 mg total) by mouth 2 (two) times a day with meals   ergocalciferol (ERGOCALCIFEROL) 1.25 MG (36642 UT) capsule   Yes No   Sig: once a week   Patient not taking: Reported on 3/14/2025   ezetimibe (ZETIA) 10 mg tablet  Self No No   Sig: Take 1 tablet (10 mg total) by mouth daily   glipiZIDE (GLUCOTROL XL) 10 mg 24 hr tablet  Self No No   Sig: Take 1 tablet (10 mg total) by mouth daily   glucose blood (OneTouch Verio) test strip  Self No No   Sig: Check blood sugars twice daily. Please substitute with appropriate alternative as covered by patient's insurance. Dx: E11.65   metFORMIN (GLUCOPHAGE) 1000 MG tablet  Self No No   Sig: Take 1 tablet (1,000 mg total) by mouth 2 (two) times a day   rosuvastatin (CRESTOR) 40 MG tablet  Self No No   Sig: Take 1 tablet (40 mg total) by mouth daily   sacubitril-valsartan (Entresto)  MG TABS  Self No No   Sig: Take 1 tablet by mouth 2 (two) times a day   spironolactone (ALDACTONE) 25 mg tablet  " Self No No   Sig: Take 1 tablet (25 mg total) by mouth daily      Facility-Administered Medications: None       Portions of the record may have been created with voice recognition software. Occasional wrong word or \"sound a like\" substitutions may have occurred due to the inherent limitations of voice recognition software. Read the chart carefully and recognize, using context, where substitutions have occurred.       ED Course  ED Course as of 05/23/25 2333   Fri May 23, 2025   2308 Unable to remove foreign body with 18-gauge needle.         Critical Care Time  Procedures           [1]   Past Medical History:  Diagnosis Date    Diabetes mellitus (HCC)     Heart attack (HCC) 2023    Hyperlipidemia    [2]   Past Surgical History:  Procedure Laterality Date    CARDIAC PACEMAKER PLACEMENT  2023    CARDIAC SURGERY  11/2023     "

## 2025-05-24 NOTE — ED PROVIDER NOTES
Time reflects when diagnosis was documented in both MDM as applicable and the Disposition within this note       Time User Action Codes Description Comment    5/23/2025 11:06 PM Bubba Willis Add [T15.02XA] Acute foreign body of left cornea, initial encounter           ED Disposition       ED Disposition   Discharge    Condition   Stable    Date/Time   Fri May 23, 2025 11:05 PM    Comment   Deb Quintero discharge to home/self care.                   Assessment & Plan       Medical Decision Making  Concern for retained foreign body of the eye, additional consideration for globe rupture, bacterial infection.  Noncontact lens wearer.  Will get visual acuity, attempted removal.  Will provide antibiotic drops and discharge home.  Attempted removal with cotton swab and 18-gauge needle.  Attempts were unsuccessful.  Will have patient follow-up immediately with ophthalmology in the morning.    Patient discharged with ofloxacin drops, tetanus updated.    Disposition: Discharged with instructions to obtain outpatient follow up of patient's symptoms and findings, with strict return precautions if patient develops new or worsening symptoms. Patient understands this plan and is agreeable. All questions answered. Patient discharged home with return precautions.     Risk  Prescription drug management.             Medications   fluorescein sodium sterile ophthalmic strip 1 strip (1 strip Both Eyes Given 5/23/25 2245)   tetracaine 0.5 % ophthalmic solution 2 drop (2 drops Both Eyes Given 5/23/25 2245)   ofloxacin (OCUFLOX) 0.3 % ophthalmic solution 1 drop (1 drop Left Eye Given 5/23/25 2341)   tetanus-diphtheria-acellular pertussis (BOOSTRIX) IM injection 0.5 mL (0.5 mL Intramuscular Given 5/23/25 2341)       ED Risk Strat Scores                    No data recorded                            History of Present Illness       Chief Complaint   Patient presents with    Eye Problem     Pt was grinding steel when a part of it got  into his eye. Pt reports wearing reading glasses when this occurred. Pt vision is blurry and eye is red and teary in triage.        Past Medical History[1]   Past Surgical History[2]   Family History[3]   Social History[4]   E-Cigarette/Vaping    E-Cigarette Use Never User       E-Cigarette/Vaping Substances    Nicotine No     THC No     CBD No     Flavoring No     Other No     Unknown No       I have reviewed and agree with the history as documented.     55-year-old male presents emergency department complaining of pain and discomfort of left eye.  He states he was using an angle  when he noticed severe pain and discomfort of the left eye.  He complains of severe pain and discomfort.  Does not wear contacts.  Mild discomfort with movement of eye.  No discharge from eye.        Review of Systems   Eyes:  Positive for pain and redness.   All other systems reviewed and are negative.          Objective       ED Triage Vitals   Temperature Pulse Blood Pressure Respirations SpO2 Patient Position - Orthostatic VS   05/23/25 2131 05/23/25 2131 05/23/25 2132 05/23/25 2131 05/23/25 2131 --   97.5 °F (36.4 °C) 80 (!) 229/112 16 98 %       Temp Source Heart Rate Source BP Location FiO2 (%) Pain Score    05/23/25 2131 -- -- -- 05/23/25 2131    Tympanic    7      Vitals      Date and Time Temp Pulse SpO2 Resp BP Pain Score FACES Pain Rating User   05/23/25 2132 -- -- -- -- 229/112 -- -- MB   05/23/25 2131 97.5 °F (36.4 °C) 80 98 % 16 -- 7 -- MB            Physical Exam  Vitals and nursing note reviewed.   Constitutional:       General: He is not in acute distress.     Appearance: He is well-developed.   HENT:      Head: Normocephalic and atraumatic.     Eyes:      Intraocular pressure: Right eye pressure is 10 mmHg. Left eye pressure is 10 mmHg.      Conjunctiva/sclera: Conjunctivae normal.      Pupils: Pupils are equal, round, and reactive to light. Pupils are equal.      Right eye: Pupil is reactive and not sluggish. No  fluorescein uptake. Sarbjit exam negative.      Left eye: Pupil is reactive and not sluggish. Fluorescein uptake present. Sarbjit exam negative.       Comments: Negative Sarbjit sign, no teardrop pupil.  Normal intraocular pressures.  No signs of corneal abrasion or corneal ulcer on fluorescein stain.     Cardiovascular:      Rate and Rhythm: Normal rate and regular rhythm.      Heart sounds: No murmur heard.  Pulmonary:      Effort: Pulmonary effort is normal. No respiratory distress.      Breath sounds: Normal breath sounds.   Abdominal:      Palpations: Abdomen is soft.      Tenderness: There is no abdominal tenderness.     Musculoskeletal:         General: No swelling.      Cervical back: Neck supple.     Skin:     General: Skin is warm and dry.      Capillary Refill: Capillary refill takes less than 2 seconds.     Neurological:      General: No focal deficit present.      Mental Status: He is alert and oriented to person, place, and time. Mental status is at baseline.      GCS: GCS eye subscore is 4. GCS verbal subscore is 5. GCS motor subscore is 6.      Cranial Nerves: No cranial nerve deficit.      Sensory: No sensory deficit.      Motor: No weakness.      Coordination: Coordination normal.      Gait: Gait normal.     Psychiatric:         Mood and Affect: Mood normal.         Results Reviewed       None            No orders to display       Procedures    ED Medication and Procedure Management   Prior to Admission Medications   Prescriptions Last Dose Informant Patient Reported? Taking?   Blood Glucose Monitoring Suppl (OneTouch Verio Reflect) w/Device KIT  Self No No   Sig: Check blood sugars twice daily. Please substitute with appropriate alternative as covered by patient's insurance. Dx: E11.65   Empagliflozin (Jardiance) 25 MG TABS  Self No No   Sig: Take 1 tablet (25 mg total) by mouth every morning   OneTouch Delica Lancets 33G MISC  Self No No   Sig: Check blood sugars twice daily. Please substitute with  appropriate alternative as covered by patient's insurance. Dx: E11.65   aspirin (ECOTRIN LOW STRENGTH) 81 mg EC tablet  Self No No   Sig: Take 1 tablet (81 mg total) by mouth daily   carvedilol (COREG) 25 mg tablet  Self No No   Sig: Take 1 tablet (25 mg total) by mouth 2 (two) times a day with meals   ergocalciferol (ERGOCALCIFEROL) 1.25 MG (03823 UT) capsule   Yes No   Sig: once a week   Patient not taking: Reported on 3/14/2025   ezetimibe (ZETIA) 10 mg tablet  Self No No   Sig: Take 1 tablet (10 mg total) by mouth daily   glipiZIDE (GLUCOTROL XL) 10 mg 24 hr tablet  Self No No   Sig: Take 1 tablet (10 mg total) by mouth daily   glucose blood (OneTouch Verio) test strip  Self No No   Sig: Check blood sugars twice daily. Please substitute with appropriate alternative as covered by patient's insurance. Dx: E11.65   metFORMIN (GLUCOPHAGE) 1000 MG tablet  Self No No   Sig: Take 1 tablet (1,000 mg total) by mouth 2 (two) times a day   rosuvastatin (CRESTOR) 40 MG tablet  Self No No   Sig: Take 1 tablet (40 mg total) by mouth daily   sacubitril-valsartan (Entresto)  MG TABS  Self No No   Sig: Take 1 tablet by mouth 2 (two) times a day   spironolactone (ALDACTONE) 25 mg tablet  Self No No   Sig: Take 1 tablet (25 mg total) by mouth daily      Facility-Administered Medications: None     Discharge Medication List as of 5/23/2025 11:14 PM        CONTINUE these medications which have NOT CHANGED    Details   aspirin (ECOTRIN LOW STRENGTH) 81 mg EC tablet Take 1 tablet (81 mg total) by mouth daily, Starting Mon 12/16/2024, Normal      Blood Glucose Monitoring Suppl (OneTouch Verio Reflect) w/Device KIT Check blood sugars twice daily. Please substitute with appropriate alternative as covered by patient's insurance. Dx: E11.65, Normal      carvedilol (COREG) 25 mg tablet Take 1 tablet (25 mg total) by mouth 2 (two) times a day with meals, Starting Mon 12/16/2024, Normal      Empagliflozin (Jardiance) 25 MG TABS Take 1  tablet (25 mg total) by mouth every morning, Starting Mon 12/16/2024, Normal      ergocalciferol (ERGOCALCIFEROL) 1.25 MG (88884 UT) capsule once a week, Historical Med      ezetimibe (ZETIA) 10 mg tablet Take 1 tablet (10 mg total) by mouth daily, Starting Mon 12/16/2024, Normal      glipiZIDE (GLUCOTROL XL) 10 mg 24 hr tablet Take 1 tablet (10 mg total) by mouth daily, Starting Mon 12/16/2024, Normal      glucose blood (OneTouch Verio) test strip Check blood sugars twice daily. Please substitute with appropriate alternative as covered by patient's insurance. Dx: E11.65, Normal      metFORMIN (GLUCOPHAGE) 1000 MG tablet Take 1 tablet (1,000 mg total) by mouth 2 (two) times a day, Starting Mon 12/16/2024, Normal      OneTouch Delica Lancets 33G MISC Check blood sugars twice daily. Please substitute with appropriate alternative as covered by patient's insurance. Dx: E11.65, Normal      rosuvastatin (CRESTOR) 40 MG tablet Take 1 tablet (40 mg total) by mouth daily, Starting Mon 12/16/2024, Normal      sacubitril-valsartan (Entresto)  MG TABS Take 1 tablet by mouth 2 (two) times a day, Starting Mon 12/16/2024, Normal      spironolactone (ALDACTONE) 25 mg tablet Take 1 tablet (25 mg total) by mouth daily, Starting Mon 12/16/2024, Normal             ED SEPSIS DOCUMENTATION   Time reflects when diagnosis was documented in both MDM as applicable and the Disposition within this note       Time User Action Codes Description Comment    5/23/2025 11:06 PM Bubba Willis Add [T15.02XA] Acute foreign body of left cornea, initial encounter                      [1]   Past Medical History:  Diagnosis Date    Diabetes mellitus (HCC)     Heart attack (HCC) 2023    Hyperlipidemia    [2]   Past Surgical History:  Procedure Laterality Date    CARDIAC PACEMAKER PLACEMENT  2023    CARDIAC SURGERY  11/2023   [3]   Family History  Problem Relation Name Age of Onset    Hyperlipidemia Mother      Coronary artery disease Father       Hyperlipidemia Father      No Known Problems Sister Amber     Hypertension Brother Sumit     Hypertension Brother Demetris     Heart attack Paternal Grandfather     [4]   Social History  Tobacco Use    Smoking status: Never    Smokeless tobacco: Never   Vaping Use    Vaping status: Never Used   Substance Use Topics    Alcohol use: Yes     Comment: casual    Drug use: Never        Bubba Willis DO  05/23/25 2682

## 2025-06-11 ENCOUNTER — REMOTE DEVICE CLINIC VISIT (OUTPATIENT)
Dept: CARDIOLOGY CLINIC | Facility: CLINIC | Age: 55
End: 2025-06-11
Payer: COMMERCIAL

## 2025-06-11 ENCOUNTER — RESULTS FOLLOW-UP (OUTPATIENT)
Dept: NON INVASIVE DIAGNOSTICS | Facility: HOSPITAL | Age: 55
End: 2025-06-11

## 2025-06-11 DIAGNOSIS — Z95.810 PRESENCE OF AUTOMATIC CARDIOVERTER/DEFIBRILLATOR (AICD): Primary | ICD-10-CM

## 2025-06-11 PROCEDURE — 93295 DEV INTERROG REMOTE 1/2/MLT: CPT | Performed by: INTERNAL MEDICINE

## 2025-06-11 PROCEDURE — 93296 REM INTERROG EVL PM/IDS: CPT | Performed by: INTERNAL MEDICINE

## 2025-06-11 NOTE — PROGRESS NOTES
"[employer] DUAL ICD/ ACTIVE SYSTEM IS MRI CONDITIONAL   Jun 07, 2025 23:12 - Yellow Alert - Nonsustained ventricular arrhythmia episode(s).   LATITUDE TRANSMISSION: BATTERY VOLTAGE ADEQUATE (11 YR). AP 2%.  0%. ALL LEAD PARAMETERS WITHIN NORMAL LIMITS. 33 VHR EPISODES W/ AVAIL EGM\"S SHOWING ST @ 160-162 BPM; HX OF SAME. 1 PMT EPISODE NOTED. PT TAKES ASA & COREG. HEARTLOGIC HEART FAILURE INDEX WITHIN NORMAL RANGE (3). NORMAL DEVICE FUNCTION. [facility]     "

## 2025-06-23 ENCOUNTER — TELEPHONE (OUTPATIENT)
Age: 55
End: 2025-06-23